# Patient Record
Sex: MALE | Race: WHITE | NOT HISPANIC OR LATINO
[De-identification: names, ages, dates, MRNs, and addresses within clinical notes are randomized per-mention and may not be internally consistent; named-entity substitution may affect disease eponyms.]

---

## 2018-01-22 ENCOUNTER — NON-APPOINTMENT (OUTPATIENT)
Age: 58
End: 2018-01-22

## 2018-01-22 ENCOUNTER — APPOINTMENT (OUTPATIENT)
Dept: CARDIOLOGY | Facility: CLINIC | Age: 58
End: 2018-01-22
Payer: COMMERCIAL

## 2018-01-22 VITALS
SYSTOLIC BLOOD PRESSURE: 144 MMHG | HEART RATE: 87 BPM | OXYGEN SATURATION: 97 % | BODY MASS INDEX: 43.63 KG/M2 | WEIGHT: 278 LBS | HEIGHT: 67 IN | DIASTOLIC BLOOD PRESSURE: 78 MMHG

## 2018-01-22 VITALS — SYSTOLIC BLOOD PRESSURE: 130 MMHG | HEART RATE: 80 BPM | DIASTOLIC BLOOD PRESSURE: 76 MMHG

## 2018-01-22 PROCEDURE — 93000 ELECTROCARDIOGRAM COMPLETE: CPT

## 2018-01-22 PROCEDURE — 99244 OFF/OP CNSLTJ NEW/EST MOD 40: CPT

## 2018-05-07 ENCOUNTER — APPOINTMENT (OUTPATIENT)
Dept: CARDIOLOGY | Facility: CLINIC | Age: 58
End: 2018-05-07
Payer: COMMERCIAL

## 2018-05-07 ENCOUNTER — NON-APPOINTMENT (OUTPATIENT)
Age: 58
End: 2018-05-07

## 2018-05-07 VITALS
WEIGHT: 260 LBS | OXYGEN SATURATION: 98 % | DIASTOLIC BLOOD PRESSURE: 69 MMHG | HEART RATE: 77 BPM | SYSTOLIC BLOOD PRESSURE: 115 MMHG | BODY MASS INDEX: 40.81 KG/M2 | HEIGHT: 67 IN | TEMPERATURE: 98.3 F

## 2018-05-07 DIAGNOSIS — K64.9 UNSPECIFIED HEMORRHOIDS: ICD-10-CM

## 2018-05-07 PROCEDURE — 99214 OFFICE O/P EST MOD 30 MIN: CPT

## 2018-05-07 PROCEDURE — 36415 COLL VENOUS BLD VENIPUNCTURE: CPT

## 2018-05-07 PROCEDURE — 93000 ELECTROCARDIOGRAM COMPLETE: CPT

## 2018-05-08 LAB
ALBUMIN SERPL ELPH-MCNC: 4.2 G/DL
ALP BLD-CCNC: 113 U/L
ALT SERPL-CCNC: 15 U/L
ANION GAP SERPL CALC-SCNC: 15 MMOL/L
AST SERPL-CCNC: 16 U/L
BASOPHILS # BLD AUTO: 0.02 K/UL
BASOPHILS NFR BLD AUTO: 0.2 %
BILIRUB SERPL-MCNC: 0.5 MG/DL
BUN SERPL-MCNC: 13 MG/DL
CALCIUM SERPL-MCNC: 9.4 MG/DL
CHLORIDE SERPL-SCNC: 98 MMOL/L
CHOLEST SERPL-MCNC: 142 MG/DL
CHOLEST/HDLC SERPL: 4.1 RATIO
CO2 SERPL-SCNC: 28 MMOL/L
CREAT SERPL-MCNC: 0.82 MG/DL
EOSINOPHIL # BLD AUTO: 0.22 K/UL
EOSINOPHIL NFR BLD AUTO: 2 %
GLUCOSE SERPL-MCNC: 84 MG/DL
HBA1C MFR BLD HPLC: 7.3 %
HCT VFR BLD CALC: 40.2 %
HDLC SERPL-MCNC: 35 MG/DL
HGB BLD-MCNC: 12.8 G/DL
IMM GRANULOCYTES NFR BLD AUTO: 0.5 %
LDLC SERPL CALC-MCNC: 79 MG/DL
LYMPHOCYTES # BLD AUTO: 2.39 K/UL
LYMPHOCYTES NFR BLD AUTO: 21.7 %
MAN DIFF?: NORMAL
MCHC RBC-ENTMCNC: 29.3 PG
MCHC RBC-ENTMCNC: 31.8 GM/DL
MCV RBC AUTO: 92 FL
MONOCYTES # BLD AUTO: 0.53 K/UL
MONOCYTES NFR BLD AUTO: 4.8 %
NEUTROPHILS # BLD AUTO: 7.82 K/UL
NEUTROPHILS NFR BLD AUTO: 70.8 %
PLATELET # BLD AUTO: 265 K/UL
POTASSIUM SERPL-SCNC: 4.5 MMOL/L
PROT SERPL-MCNC: 7.1 G/DL
RBC # BLD: 4.37 M/UL
RBC # FLD: 14.6 %
SODIUM SERPL-SCNC: 141 MMOL/L
TRIGL SERPL-MCNC: 138 MG/DL
TSH SERPL-ACNC: 2.09 UIU/ML
WBC # FLD AUTO: 11.03 K/UL

## 2018-05-18 ENCOUNTER — RX RENEWAL (OUTPATIENT)
Age: 58
End: 2018-05-18

## 2018-09-28 ENCOUNTER — RX RENEWAL (OUTPATIENT)
Age: 58
End: 2018-09-28

## 2018-10-03 ENCOUNTER — MEDICATION RENEWAL (OUTPATIENT)
Age: 58
End: 2018-10-03

## 2019-01-22 ENCOUNTER — RX RENEWAL (OUTPATIENT)
Age: 59
End: 2019-01-22

## 2019-04-11 ENCOUNTER — APPOINTMENT (OUTPATIENT)
Dept: OTOLARYNGOLOGY | Facility: CLINIC | Age: 59
End: 2019-04-11
Payer: COMMERCIAL

## 2019-04-11 VITALS
HEART RATE: 66 BPM | DIASTOLIC BLOOD PRESSURE: 64 MMHG | WEIGHT: 269 LBS | SYSTOLIC BLOOD PRESSURE: 106 MMHG | BODY MASS INDEX: 42.22 KG/M2 | HEIGHT: 67 IN

## 2019-04-11 DIAGNOSIS — H92.02 OTALGIA, LEFT EAR: ICD-10-CM

## 2019-04-11 PROCEDURE — 99203 OFFICE O/P NEW LOW 30 MIN: CPT | Mod: 25

## 2019-04-11 PROCEDURE — 69210 REMOVE IMPACTED EAR WAX UNI: CPT

## 2019-04-11 RX ORDER — BLOOD SUGAR DIAGNOSTIC
STRIP MISCELLANEOUS
Qty: 500 | Refills: 0 | Status: ACTIVE | COMMUNITY
Start: 2018-02-05

## 2019-04-11 RX ORDER — INSULIN DEGLUDEC INJECTION 200 U/ML
200 INJECTION, SOLUTION SUBCUTANEOUS
Qty: 45 | Refills: 0 | Status: ACTIVE | COMMUNITY
Start: 2019-01-24

## 2019-04-11 RX ORDER — LANCETS 30 GAUGE
EACH MISCELLANEOUS
Qty: 400 | Refills: 0 | Status: ACTIVE | COMMUNITY
Start: 2018-01-17

## 2019-04-11 RX ORDER — INSULIN ASPART 100 [IU]/ML
100 INJECTION, SOLUTION INTRAVENOUS; SUBCUTANEOUS
Qty: 90 | Refills: 0 | Status: ACTIVE | COMMUNITY
Start: 2018-02-05

## 2019-04-11 NOTE — PHYSICAL EXAM
[Midline] : trachea located in midline position [de-identified] : b/l migueln [Normal] : salivary glands are normal

## 2019-04-11 NOTE — HISTORY OF PRESENT ILLNESS
[No] : patient does not have a  history of radiation therapy [None] : No risk factors have been identified. [de-identified] : 57 y/o male who came in complaining of b/l ear pressure and itchiness that happened 2-3 months ago. He states the pressure and the itchiness lasted for about 1-2 days. He states he will have intermittent episodes of b/l pressure and itchiness in his ears but has not had an episode since 3 months ago. He states he has no ear pain, discharge, fullness, pressure, tinnitus or vertigo. His wife thinks he has hearing loss but he doesn’t feel the same. Pt has no  nasal congestion, nasal discharge, epistaxis, sinus infections, facial pain, facial pressure, throat pain, dysphagia or fevers\par  [Tinnitus] : no tinnitus [Anxiety] : no anxiety [Dizziness] : no dizziness [Headache] : no headache [Hearing Loss] : no hearing loss [Otalgia] : no otalgia [Otorrhea] : no otorrhea [Vertigo] : no vertigo [Recurrent Otitis Media] : no recurrent otitis media [Otitis Media with Effusion] : no otitis media with effusion [Presbycusis] : no presbycusis [Congenital Ear Malformation] : no congenital ear malformation [Meniere Disease] : no Meniere disease [Otosclerosis] : no otosclerosis [Perilymphatic Fistula] : no perilymphatic fistula [Hypertension] : no hypertension [Loud Noise Exposure] : no history of loud noise exposure [Smoking] : no smoking [Early Onset Hearing Loss] : no early onset hearing loss [Stroke] : no stroke [Facial Pain] : no facial pain [Facial Pressure] : no facial pressure [Nasal Congestion] : no nasal congestion [Ear Pain] : no ear pain [Ear Pressure] : no ear pressure [Diplopia] : no diplopia [Ear Fullness] : no ear fullness [Allergic Rhinitis] : no allergic rhinitis [Environmental Allergies] : no environmental allergies [Seasonal Allergies] : no seasonal allergies [Environmental Allergens] : no environmental allergens [Adenoidectomy] : no adenoidectomy [Allergies] : no allergies No [Asthma] : no asthma [Neck Mass] : no neck mass [Neck Pain] : no neck pain [Chills] : no chills [Cold Intolerance] : no cold intolerance [Cough] : no cough [Fatigue] : no fatigue [Heat Intolerance] : no heat intolerance [Hyperthyroidism] : no hyperthyroidism [Sialadenitis] : no sialadenitis [Hodgkin Disease] : no hodgkin disease [Non-Hodgkin Lymphoma] : no non-hodgkin lymphoma [Tobacco Use] : no tobacco use [Alcohol Use] : no alcohol use [Graves Disease] : no graves disease [Thyroid Cancer] : no thyroid cancer

## 2019-04-11 NOTE — ASSESSMENT
[FreeTextEntry1] : 57 y/o male w/ b/l cerumen impaction \par \par After informed verbal consent is obtained, cerumen is removed from the right and left  ear canal with a curette and suction.\par Rx: \par Debrox was prescribed and  is to be placed in both ears on a routine basis to keep them free of wax.\par Routine debridement suggested to keep the ears free of wax.\par \par Pt refused audio for today\par

## 2019-04-16 ENCOUNTER — RX RENEWAL (OUTPATIENT)
Age: 59
End: 2019-04-16

## 2019-09-02 ENCOUNTER — RX RENEWAL (OUTPATIENT)
Age: 59
End: 2019-09-02

## 2019-11-06 ENCOUNTER — APPOINTMENT (OUTPATIENT)
Dept: ORTHOPEDIC SURGERY | Facility: CLINIC | Age: 59
End: 2019-11-06
Payer: COMMERCIAL

## 2019-11-06 VITALS
DIASTOLIC BLOOD PRESSURE: 74 MMHG | SYSTOLIC BLOOD PRESSURE: 145 MMHG | WEIGHT: 267 LBS | HEART RATE: 77 BPM | BODY MASS INDEX: 41.91 KG/M2 | HEIGHT: 67 IN

## 2019-11-06 DIAGNOSIS — M25.561 PAIN IN RIGHT KNEE: ICD-10-CM

## 2019-11-06 DIAGNOSIS — G89.29 PAIN IN RIGHT KNEE: ICD-10-CM

## 2019-11-06 PROCEDURE — 99203 OFFICE O/P NEW LOW 30 MIN: CPT

## 2019-11-06 PROCEDURE — 73562 X-RAY EXAM OF KNEE 3: CPT

## 2019-12-17 ENCOUNTER — RX RENEWAL (OUTPATIENT)
Age: 59
End: 2019-12-17

## 2020-01-20 ENCOUNTER — RX RENEWAL (OUTPATIENT)
Age: 60
End: 2020-01-20

## 2020-03-02 ENCOUNTER — RX RENEWAL (OUTPATIENT)
Age: 60
End: 2020-03-02

## 2020-03-09 ENCOUNTER — APPOINTMENT (OUTPATIENT)
Dept: CARDIOLOGY | Facility: CLINIC | Age: 60
End: 2020-03-09
Payer: COMMERCIAL

## 2020-03-09 ENCOUNTER — NON-APPOINTMENT (OUTPATIENT)
Age: 60
End: 2020-03-09

## 2020-03-09 VITALS
WEIGHT: 270 LBS | SYSTOLIC BLOOD PRESSURE: 130 MMHG | BODY MASS INDEX: 42.38 KG/M2 | DIASTOLIC BLOOD PRESSURE: 76 MMHG | HEART RATE: 77 BPM | HEIGHT: 67 IN | OXYGEN SATURATION: 98 % | TEMPERATURE: 97.6 F

## 2020-03-09 DIAGNOSIS — R07.9 CHEST PAIN, UNSPECIFIED: ICD-10-CM

## 2020-03-09 DIAGNOSIS — N52.9 MALE ERECTILE DYSFUNCTION, UNSPECIFIED: ICD-10-CM

## 2020-03-09 PROCEDURE — 36415 COLL VENOUS BLD VENIPUNCTURE: CPT

## 2020-03-09 PROCEDURE — 99214 OFFICE O/P EST MOD 30 MIN: CPT

## 2020-03-09 PROCEDURE — 93000 ELECTROCARDIOGRAM COMPLETE: CPT

## 2020-03-09 RX ORDER — ROSUVASTATIN CALCIUM 40 MG/1
40 TABLET, FILM COATED ORAL
Qty: 90 | Refills: 0 | Status: DISCONTINUED | COMMUNITY
Start: 2018-01-22 | End: 2020-03-09

## 2020-03-09 RX ORDER — METFORMIN ER 500 MG 500 MG/1
500 TABLET ORAL
Qty: 360 | Refills: 0 | Status: DISCONTINUED | COMMUNITY
Start: 2018-02-05 | End: 2020-03-09

## 2020-03-09 NOTE — REVIEW OF SYSTEMS
[see HPI] : see HPI [Negative] : Heme/Lymph [Recent Weight Gain (___ Lbs)] : recent [unfilled] ~Ulb weight gain [Feeling Fatigued] : not feeling fatigued [Recent Weight Loss (___ Lbs)] : no recent weight loss [Shortness Of Breath] : no shortness of breath [Dyspnea on exertion] : not dyspnea during exertion [Chest  Pressure] : chest pressure [Chest Pain] : no chest pain [Lower Ext Edema] : no extremity edema [Leg Claudication] : no intermittent leg claudication [Palpitations] : no palpitations [Heartburn] : heartburn [Change in Appetite] : no change in appetite [Urinary Frequency] : no change in urinary frequency [Skin: A Rash] : no rash: [Dizziness] : no dizziness [Depression] : no depression [Under Stress] : not under stress

## 2020-03-09 NOTE — PHYSICAL EXAM
[Normal Appearance] : normal appearance [No Deformities] : no deformities [General Appearance - In No Acute Distress] : no acute distress [Normal Conjunctiva] : the conjunctiva exhibited no abnormalities [Eyelids - No Xanthelasma] : the eyelids demonstrated no xanthelasmas [Normal Oral Mucosa] : normal oral mucosa [No Oral Pallor] : no oral pallor [No Oral Cyanosis] : no oral cyanosis [Normal Jugular Venous A Waves Present] : normal jugular venous A waves present [Normal Jugular Venous V Waves Present] : normal jugular venous V waves present [No Jugular Venous Gordon A Waves] : no jugular venous gordon A waves [Respiration, Rhythm And Depth] : normal respiratory rhythm and effort [Exaggerated Use Of Accessory Muscles For Inspiration] : no accessory muscle use [Auscultation Breath Sounds / Voice Sounds] : lungs were clear to auscultation bilaterally [Heart Rate And Rhythm] : heart rate and rhythm were normal [Heart Sounds] : normal S1 and S2 [Murmurs] : no murmurs present [Abdomen Soft] : soft [Abdomen Tenderness] : non-tender [Abdomen Mass (___ Cm)] : no abdominal mass palpated [Abnormal Walk] : normal gait [Gait - Sufficient For Exercise Testing] : the gait was sufficient for exercise testing [Nail Clubbing] : no clubbing of the fingernails [Cyanosis, Localized] : no localized cyanosis [Petechial Hemorrhages (___cm)] : no petechial hemorrhages [Skin Color & Pigmentation] : normal skin color and pigmentation [] : no rash [No Venous Stasis] : no venous stasis [Skin Lesions] : no skin lesions [No Skin Ulcers] : no skin ulcer [No Xanthoma] : no  xanthoma was observed [Oriented To Time, Place, And Person] : oriented to person, place, and time [Affect] : the affect was normal [Mood] : the mood was normal [No Anxiety] : not feeling anxious [FreeTextEntry1] : No edema bilaterally. Pulses 2+ bilaterally including pedal

## 2020-03-09 NOTE — REASON FOR VISIT
[FreeTextEntry1] : Follow up visit after "normal" cardiac cath in [facility] for exertional chest pain.

## 2020-03-09 NOTE — HISTORY OF PRESENT ILLNESS
[FreeTextEntry1] : June 9, 2014. I first saw the patient in 2004 when he was 43 years old. At that point he was overweight with diabetes and hyperlipidemia and had stopped smoking 2 years prior he came because of an episode of passing out while on the plane after he got up to go to the bathroom. His past history had been unremarkable other than some minor surgery. He had a story compatible with acid reflux. The assumption was vasopressor syncope although he did have a sestamibi stress test on March 15 of 2004. That showed no ischemia to a heart rate of 169 with a left ventricular ejection fraction of 58%. The patient had another episode after stomach upset in July while on the airplane. He was recommended to have a tilt table test and consider beta blocker. I did not see him again until 2008. At that point he was having chest pain although he was under a lot of stress. He is being followed by Dr. Reshma Nichols for his diabetes. August of 2090 seemed to have some exertional chest pain although part of it was just getting up from a chair. This progressed until October after doing some minor building around the house he had chest tightness diffusely. EKG was unremarkable. He has next rest test was not until March of 2010 and again sestamibi scan was negative with a left ventricular ejection fraction of 63% the patient reached a heart rate of 167 achieving 14 METs. In February of 2011 he noticed chest tightness walking in the hills in [place]. Again exam and EKG were unremarkable. He did not return for a stress echo until a year later in February 2012 and again this seemed to be negative although the echo was suboptimal due to body habitus. He did reach a heart rate of 160 without EKG changes or symptoms. In August 2013 he had chest pain one night prior to leaving on a cruise. He came in for a stress echocardiogram which again showed no ST changes to a heart rate of 132 and 10 METs. Echocardiogram was only slightly suboptimal and showed no wall motion abnormalities.\par June 9, 2014. About one week ago walking to [employer] he noticed exertional chest tightness relieved with rest. However this has not recurred. 2 days ago he noticed significant swelling of his ankles. This happened to him once when he was in [place] and he was taken to a physician there who gave him hydrochlorothiazide and it resolved. The patient took his own hydrochlorothiazide and this seemed to improve somewhat but he is here now because he is anxious about these findings. He has been walking normally and even exercising a little bit since the chest pain episode and it has not recurred. In the office he is asymptomatic though slightly anxious. His EKG is totally within normal limits.\par January 22, 2018. In November the patient was in [place] and had classic exertional chest pressure walking uphill. Went to the hospital and was admitted, ruled out, coronary angiography totally normal and he was sent home. He is here now for followup. I reviewed the angiogram, and there is probably plaquing in the LAD, but no obstructive disease. He is currently seeing the endocrinology group of Dr. Ellsworth, etc. He is on Crestor, aspirin, and metformin, along with insulin. He was given a prescription in [place] for ramipril, but they did not fill it. He had been on Invokana in the past, but it made him run to the bathroom, way too much. Since being back he has had  rare exertional chest pain, usually after smoking a cigar. Otherwise, he is active and asymptomatic. He claims his labs recently, and his blood pressure at endocrine or excellent. (Normal TSH, and vitamin D 16, BUN 22, creatinine 0.5, 5, and potassium 4.7, normal LFTs, cholesterol 142, HDL 37, LDL 88, triglycerides 131. Hemoglobin A1c 7.3.)\par May 7, 2018. Patient is here in followup. Was in [place] in the interim. Has lost 18 pounds, but says that he had gained in between and lost even more since. No chest pain or shortness of breath. EKG within the normal limits. No change in his cardiac medicines with just a little change in his long-acting insulin. Patient's issue now seems to be hemorrhoids, and he will be seeing Dr. Olivarez and then Dr. Mcgee.\par March 9, 2020.  Almost 2 years since he was here.  Is now on atorvastatin 80, Ramipril 5, and aspirin 81 mg.  Otherwise is on 3 different types of insulin.  Weight has gone back up from 260-270.  Only gets exertional chest pain if he takes a little too much scotch usually on Friday night and then goes walking uphill.  This would suggest that his classic exertional chest pain with normal coronary arteries was probably exercise-induced esophageal reflux.  In any case with his risk factors he still needs close monitoring of his cardiovascular system.  Most of the time he is asymptomatic and his EKG has sinus rhythm and is within normal limits.  His blood pressure is under good control.  There may have been a mild LAD plaque at the time of his angiogram.

## 2020-03-11 LAB
ALBUMIN SERPL ELPH-MCNC: 4.1 G/DL
ALP BLD-CCNC: 132 U/L
ALT SERPL-CCNC: 15 U/L
ANION GAP SERPL CALC-SCNC: 11 MMOL/L
AST SERPL-CCNC: 11 U/L
BASOPHILS # BLD AUTO: 0.05 K/UL
BASOPHILS NFR BLD AUTO: 0.5 %
BILIRUB SERPL-MCNC: 0.5 MG/DL
BUN SERPL-MCNC: 14 MG/DL
CALCIUM SERPL-MCNC: 9.7 MG/DL
CHLORIDE SERPL-SCNC: 97 MMOL/L
CHOLEST SERPL-MCNC: 151 MG/DL
CHOLEST/HDLC SERPL: 4.5 RATIO
CO2 SERPL-SCNC: 30 MMOL/L
CREAT SERPL-MCNC: 0.75 MG/DL
EOSINOPHIL # BLD AUTO: 0.21 K/UL
EOSINOPHIL NFR BLD AUTO: 2.3 %
ESTIMATED AVERAGE GLUCOSE: 174 MG/DL
GLUCOSE SERPL-MCNC: 139 MG/DL
HBA1C MFR BLD HPLC: 7.7 %
HCT VFR BLD CALC: 40.5 %
HDLC SERPL-MCNC: 34 MG/DL
HGB BLD-MCNC: 12.9 G/DL
IMM GRANULOCYTES NFR BLD AUTO: 0.8 %
LDLC SERPL CALC-MCNC: 96 MG/DL
LYMPHOCYTES # BLD AUTO: 2.1 K/UL
LYMPHOCYTES NFR BLD AUTO: 22.7 %
MAN DIFF?: NORMAL
MCHC RBC-ENTMCNC: 29.2 PG
MCHC RBC-ENTMCNC: 31.9 GM/DL
MCV RBC AUTO: 91.6 FL
MONOCYTES # BLD AUTO: 0.55 K/UL
MONOCYTES NFR BLD AUTO: 5.9 %
NEUTROPHILS # BLD AUTO: 6.27 K/UL
NEUTROPHILS NFR BLD AUTO: 67.8 %
PLATELET # BLD AUTO: 247 K/UL
POTASSIUM SERPL-SCNC: 4.6 MMOL/L
PROT SERPL-MCNC: 7 G/DL
RBC # BLD: 4.42 M/UL
RBC # FLD: 14.5 %
SODIUM SERPL-SCNC: 138 MMOL/L
TRIGL SERPL-MCNC: 106 MG/DL
TSH SERPL-ACNC: 2.85 UIU/ML
WBC # FLD AUTO: 9.25 K/UL

## 2020-05-12 ENCOUNTER — APPOINTMENT (OUTPATIENT)
Dept: INTERNAL MEDICINE | Facility: CLINIC | Age: 60
End: 2020-05-12
Payer: COMMERCIAL

## 2020-05-12 VITALS
WEIGHT: 268 LBS | SYSTOLIC BLOOD PRESSURE: 126 MMHG | BODY MASS INDEX: 41.57 KG/M2 | OXYGEN SATURATION: 97 % | HEART RATE: 80 BPM | TEMPERATURE: 98.4 F | DIASTOLIC BLOOD PRESSURE: 74 MMHG | HEIGHT: 67.5 IN

## 2020-05-12 DIAGNOSIS — J30.2 OTHER SEASONAL ALLERGIC RHINITIS: ICD-10-CM

## 2020-05-12 DIAGNOSIS — Z80.0 FAMILY HISTORY OF MALIGNANT NEOPLASM OF DIGESTIVE ORGANS: ICD-10-CM

## 2020-05-12 PROCEDURE — 93000 ELECTROCARDIOGRAM COMPLETE: CPT | Mod: 59

## 2020-05-12 PROCEDURE — G0444 DEPRESSION SCREEN ANNUAL: CPT

## 2020-05-12 PROCEDURE — 99396 PREV VISIT EST AGE 40-64: CPT | Mod: 25

## 2020-05-12 PROCEDURE — 36415 COLL VENOUS BLD VENIPUNCTURE: CPT

## 2020-05-12 RX ORDER — DULAGLUTIDE 1.5 MG/.5ML
1.5 INJECTION, SOLUTION SUBCUTANEOUS
Qty: 6 | Refills: 0 | Status: DISCONTINUED | COMMUNITY
Start: 2019-04-08 | End: 2020-05-12

## 2020-05-12 RX ORDER — ERGOCALCIFEROL 1.25 MG/1
1.25 MG CAPSULE, LIQUID FILLED ORAL
Qty: 13 | Refills: 0 | Status: ACTIVE | COMMUNITY
Start: 2019-08-16

## 2020-05-12 RX ORDER — MONTELUKAST 10 MG/1
10 TABLET, FILM COATED ORAL
Qty: 30 | Refills: 0 | Status: DISCONTINUED | COMMUNITY
Start: 2020-05-04

## 2020-05-12 RX ORDER — FLASH GLUCOSE SENSOR
KIT MISCELLANEOUS
Qty: 4 | Refills: 0 | Status: ACTIVE | COMMUNITY
Start: 2019-12-19

## 2020-05-13 ENCOUNTER — APPOINTMENT (OUTPATIENT)
Dept: ORTHOPEDIC SURGERY | Facility: CLINIC | Age: 60
End: 2020-05-13
Payer: COMMERCIAL

## 2020-05-13 VITALS
HEART RATE: 82 BPM | SYSTOLIC BLOOD PRESSURE: 128 MMHG | WEIGHT: 268 LBS | TEMPERATURE: 98.9 F | DIASTOLIC BLOOD PRESSURE: 76 MMHG | BODY MASS INDEX: 41.57 KG/M2 | HEIGHT: 67.5 IN

## 2020-05-13 DIAGNOSIS — G56.03 CARPAL TUNNEL SYNDROM,BILATERAL UPPER LIMBS: ICD-10-CM

## 2020-05-13 PROCEDURE — 99214 OFFICE O/P EST MOD 30 MIN: CPT | Mod: 25

## 2020-05-13 PROCEDURE — 20526 THER INJECTION CARP TUNNEL: CPT | Mod: 50

## 2020-05-18 RX ORDER — SILDENAFIL 50 MG/1
50 TABLET ORAL
Qty: 6 | Refills: 5 | Status: ACTIVE | COMMUNITY
Start: 2020-03-09

## 2020-06-04 ENCOUNTER — RX CHANGE (OUTPATIENT)
Age: 60
End: 2020-06-04

## 2020-06-07 PROBLEM — Z80.0 FAMILY HISTORY OF PANCREATIC CANCER: Status: ACTIVE | Noted: 2020-06-07

## 2020-06-07 NOTE — HEALTH RISK ASSESSMENT
[No] : In the past 12 months have you used drugs other than those required for medical reasons? No [No falls in past year] : Patient reported no falls in the past year [Patient reported colonoscopy was normal] : Patient reported colonoscopy was normal [None] : None [Employed] : employed [] :  [] : No [ColonoscopyDate] : 12/18

## 2020-06-07 NOTE — PHYSICAL EXAM
[No Acute Distress] : no acute distress [Well Nourished] : well nourished [Well Developed] : well developed [Well-Appearing] : well-appearing [Normal Voice/Communication] : normal voice/communication [Normal Sclera/Conjunctiva] : normal sclera/conjunctiva [PERRL] : pupils equal round and reactive to light [Normal Outer Ear/Nose] : the outer ears and nose were normal in appearance [Normal Oropharynx] : the oropharynx was normal [Normal TMs] : both tympanic membranes were normal [No JVD] : no jugular venous distention [Supple] : supple [No Lymphadenopathy] : no lymphadenopathy [No Respiratory Distress] : no respiratory distress  [Thyroid Normal, No Nodules] : the thyroid was normal and there were no nodules present [No Accessory Muscle Use] : no accessory muscle use [Clear to Auscultation] : lungs were clear to auscultation bilaterally [Normal Rate] : normal rate  [Regular Rhythm] : with a regular rhythm [Normal S1, S2] : normal S1 and S2 [No Murmur] : no murmur heard [No Abdominal Bruit] : a ~M bruit was not heard ~T in the abdomen [No Carotid Bruits] : no carotid bruits [No Varicosities] : no varicosities [Pedal Pulses Present] : the pedal pulses are present [No Edema] : there was no peripheral edema [Soft] : abdomen soft [No Extremity Clubbing/Cyanosis] : no extremity clubbing/cyanosis [Non Tender] : non-tender [Non-distended] : non-distended [Normal Bowel Sounds] : normal bowel sounds [No HSM] : no HSM [Normal Supraclavicular Nodes] : no supraclavicular lymphadenopathy [Normal Posterior Cervical Nodes] : no posterior cervical lymphadenopathy [Normal Anterior Cervical Nodes] : no anterior cervical lymphadenopathy [No CVA Tenderness] : no CVA  tenderness [No Spinal Tenderness] : no spinal tenderness [No Joint Swelling] : no joint swelling [Grossly Normal Strength/Tone] : grossly normal strength/tone [No Rash] : no rash [Coordination Grossly Intact] : coordination grossly intact [Normal Gait] : normal gait [No Focal Deficits] : no focal deficits [Normal Affect] : the affect was normal [Speech Grossly Normal] : speech grossly normal [Alert and Oriented x3] : oriented to person, place, and time [Normal Mood] : the mood was normal [Normal Insight/Judgement] : insight and judgment were intact [de-identified] : obese

## 2020-06-07 NOTE — PLAN
[FreeTextEntry1] : discussed w pt \par \par check routine labs as below \par monitor Hgba1c , pt reports in 7-8 % range recently\par following w endocrine for chronic DM and obesity management \par \par cont current rx\par diet, exercise, weight loss advised \par low carb intake \par \par ophtho f/u yearly \par \par cont PPI for GERD control \par \par check additional routine labs \par \par vaccines UTD as per pt\par colonoscopy screening UTD \par \par RTO 6 months for f/u or earlier prn if any new concerns

## 2020-06-07 NOTE — HISTORY OF PRESENT ILLNESS
[de-identified] : 58 y/o man presents for initial visit to establish primary care w new internist. following previously w Dr Rand as PMD. he feels well overall currently. \par \par chronic conditions significant for type II DM on rx, following w Dr Hurley ,endocrinologist regularly. on Tresiba, Ozempic inj, Novalog prn, ACE-I\par hyperlipidemia on statin \par obesity chronic \par chronic GERD on PPI \par \par seasonal allergies on antihistamine regularly during allergy season \par \par last colonoscopy screening in 2018 w Dr Olivarez GI, pt recalls normal results

## 2020-06-15 LAB
ALBUMIN SERPL ELPH-MCNC: 4.3 G/DL
ALP BLD-CCNC: 134 U/L
ALT SERPL-CCNC: 11 U/L
ANION GAP SERPL CALC-SCNC: 14 MMOL/L
APPEARANCE: CLEAR
AST SERPL-CCNC: 12 U/L
BILIRUB SERPL-MCNC: 0.5 MG/DL
BILIRUBIN URINE: NEGATIVE
BLOOD URINE: NEGATIVE
BUN SERPL-MCNC: 17 MG/DL
CALCIUM SERPL-MCNC: 9.3 MG/DL
CHLORIDE SERPL-SCNC: 100 MMOL/L
CHOLEST SERPL-MCNC: 124 MG/DL
CHOLEST/HDLC SERPL: 3.6 RATIO
CO2 SERPL-SCNC: 24 MMOL/L
COLOR: YELLOW
CREAT SERPL-MCNC: 0.82 MG/DL
CREAT SPEC-SCNC: 85 MG/DL
ESTIMATED AVERAGE GLUCOSE: 154 MG/DL
GLUCOSE QUALITATIVE U: NEGATIVE
GLUCOSE SERPL-MCNC: 116 MG/DL
HBA1C MFR BLD HPLC: 7 %
HDLC SERPL-MCNC: 34 MG/DL
KETONES URINE: NEGATIVE
LDLC SERPL CALC-MCNC: 76 MG/DL
LEUKOCYTE ESTERASE URINE: NEGATIVE
MICROALBUMIN 24H UR DL<=1MG/L-MCNC: <1.2 MG/DL
MICROALBUMIN/CREAT 24H UR-RTO: NORMAL MG/G
NITRITE URINE: NEGATIVE
PH URINE: 7
POTASSIUM SERPL-SCNC: 4.6 MMOL/L
PROT SERPL-MCNC: 7.2 G/DL
PROTEIN URINE: NEGATIVE
PSA SERPL-MCNC: 0.61 NG/ML
SODIUM SERPL-SCNC: 138 MMOL/L
SPECIFIC GRAVITY URINE: 1.02
T4 FREE SERPL-MCNC: 1.3 NG/DL
TRIGL SERPL-MCNC: 67 MG/DL
TSH SERPL-ACNC: 2.55 UIU/ML
UROBILINOGEN URINE: NORMAL

## 2020-06-16 ENCOUNTER — RX RENEWAL (OUTPATIENT)
Age: 60
End: 2020-06-16

## 2020-07-27 ENCOUNTER — OUTPATIENT (OUTPATIENT)
Dept: OUTPATIENT SERVICES | Facility: HOSPITAL | Age: 60
LOS: 1 days | End: 2020-07-27
Payer: COMMERCIAL

## 2020-07-27 ENCOUNTER — APPOINTMENT (OUTPATIENT)
Dept: RADIOLOGY | Facility: CLINIC | Age: 60
End: 2020-07-27
Payer: COMMERCIAL

## 2020-07-27 DIAGNOSIS — Z00.8 ENCOUNTER FOR OTHER GENERAL EXAMINATION: ICD-10-CM

## 2020-07-27 PROCEDURE — 71100 X-RAY EXAM RIBS UNI 2 VIEWS: CPT | Mod: 26,RT

## 2020-07-27 PROCEDURE — 71100 X-RAY EXAM RIBS UNI 2 VIEWS: CPT

## 2020-08-06 ENCOUNTER — APPOINTMENT (OUTPATIENT)
Dept: INTERNAL MEDICINE | Facility: CLINIC | Age: 60
End: 2020-08-06
Payer: COMMERCIAL

## 2020-08-06 VITALS
HEIGHT: 67.5 IN | BODY MASS INDEX: 41.42 KG/M2 | WEIGHT: 267 LBS | OXYGEN SATURATION: 98 % | SYSTOLIC BLOOD PRESSURE: 122 MMHG | HEART RATE: 79 BPM | TEMPERATURE: 98.6 F | DIASTOLIC BLOOD PRESSURE: 70 MMHG

## 2020-08-06 DIAGNOSIS — S29.012S: ICD-10-CM

## 2020-08-06 DIAGNOSIS — Z23 ENCOUNTER FOR IMMUNIZATION: ICD-10-CM

## 2020-08-06 PROCEDURE — 90715 TDAP VACCINE 7 YRS/> IM: CPT

## 2020-08-06 PROCEDURE — 90471 IMMUNIZATION ADMIN: CPT

## 2020-08-06 PROCEDURE — 99214 OFFICE O/P EST MOD 30 MIN: CPT | Mod: 25

## 2020-08-06 NOTE — HISTORY OF PRESENT ILLNESS
[de-identified] : presents for eval of R mid back discomfort intermittently for about 3 months. he fell in 4/20, onto his back. had significant pain then, slowly improved. now has pain only when bending in certain positions. taking aleve prn, w tylenol. last night was first time he had improvement in weeks. saw pain management Dr Barker recently. xrays R ribs ordered , no fractures .he has known C spine OA and degenerative changes, managed conservatively. no dyspnea or pleuritic pain. no bruising. feels much better currently \par \par obesity unchanged\par \par DM managed w his endocrinologist \par \par he would like to discuss Tdap vaccine as his daughter in law will be due to give birth soon

## 2020-08-06 NOTE — ASSESSMENT
[FreeTextEntry1] : discussed w pt \par \par reviewed recent xrays ribs, no fractures. DJD of spine noted. most likely residual muscular strain from his fall few months ago. he noted significant improvement today. advised cont to try Aleve prn cautiously. heat advised. offered PT if needed, he prefers to monitor for now. \par \par cont efforts at diet control, weight loss \par \par Tdap vaccine discussed and administered today \par \par RTO 3-4 months or earlier prn

## 2020-08-06 NOTE — PHYSICAL EXAM
[Normal Voice/Communication] : normal voice/communication [Well-Appearing] : well-appearing [No Acute Distress] : no acute distress [No Lymphadenopathy] : no lymphadenopathy [No Accessory Muscle Use] : no accessory muscle use [No Respiratory Distress] : no respiratory distress  [Regular Rhythm] : with a regular rhythm [Clear to Auscultation] : lungs were clear to auscultation bilaterally [Normal Rate] : normal rate  [Normal S1, S2] : normal S1 and S2 [No Murmur] : no murmur heard [No CVA Tenderness] : no CVA  tenderness [Normal Gait] : normal gait [Normal Mood] : the mood was normal [No Spinal Tenderness] : no spinal tenderness [Normal Affect] : the affect was normal [Normal Insight/Judgement] : insight and judgment were intact

## 2020-08-06 NOTE — REVIEW OF SYSTEMS
[Muscle Pain] : muscle pain [Joint Pain] : no joint pain [Muscle Weakness] : no muscle weakness [Back Pain] : back pain [Joint Swelling] : no joint swelling [Negative] : Heme/Lymph

## 2020-09-15 ENCOUNTER — APPOINTMENT (OUTPATIENT)
Dept: INTERNAL MEDICINE | Facility: CLINIC | Age: 60
End: 2020-09-15

## 2020-09-21 ENCOUNTER — APPOINTMENT (OUTPATIENT)
Dept: CARDIOLOGY | Facility: CLINIC | Age: 60
End: 2020-09-21
Payer: COMMERCIAL

## 2020-09-21 ENCOUNTER — NON-APPOINTMENT (OUTPATIENT)
Age: 60
End: 2020-09-21

## 2020-09-21 VITALS
HEART RATE: 61 BPM | OXYGEN SATURATION: 99 % | RESPIRATION RATE: 17 BRPM | HEIGHT: 67.5 IN | DIASTOLIC BLOOD PRESSURE: 60 MMHG | BODY MASS INDEX: 41.57 KG/M2 | SYSTOLIC BLOOD PRESSURE: 128 MMHG | WEIGHT: 268 LBS | TEMPERATURE: 97.5 F

## 2020-09-21 PROCEDURE — 99214 OFFICE O/P EST MOD 30 MIN: CPT

## 2020-09-21 PROCEDURE — 93000 ELECTROCARDIOGRAM COMPLETE: CPT

## 2020-09-21 PROCEDURE — 36415 COLL VENOUS BLD VENIPUNCTURE: CPT

## 2020-09-21 NOTE — HISTORY OF PRESENT ILLNESS
[FreeTextEntry1] : June 9, 2014. I first saw the patient in 2004 when he was 43 years old. At that point he was overweight with diabetes and hyperlipidemia and had stopped smoking 2 years prior he came because of an episode of passing out while on the plane after he got up to go to the bathroom. His past history had been unremarkable other than some minor surgery. He had a story compatible with acid reflux. The assumption was vasopressor syncope although he did have a sestamibi stress test on March 15 of 2004. That showed no ischemia to a heart rate of 169 with a left ventricular ejection fraction of 58%. The patient had another episode after stomach upset in July while on the airplane. He was recommended to have a tilt table test and consider beta blocker. I did not see him again until 2008. At that point he was having chest pain although he was under a lot of stress. He is being followed by Dr. Reshma Nichols for his diabetes. August of 2090 seemed to have some exertional chest pain although part of it was just getting up from a chair. This progressed until October after doing some minor building around the house he had chest tightness diffusely. EKG was unremarkable. He has next rest test was not until March of 2010 and again sestamibi scan was negative with a left ventricular ejection fraction of 63% the patient reached a heart rate of 167 achieving 14 METs. In February of 2011 he noticed chest tightness walking in the hills in [place]. Again exam and EKG were unremarkable. He did not return for a stress echo until a year later in February 2012 and again this seemed to be negative although the echo was suboptimal due to body habitus. He did reach a heart rate of 160 without EKG changes or symptoms. In August 2013 he had chest pain one night prior to leaving on a cruise. He came in for a stress echocardiogram which again showed no ST changes to a heart rate of 132 and 10 METs. Echocardiogram was only slightly suboptimal and showed no wall motion abnormalities.\par June 9, 2014. About one week ago walking to [employer] he noticed exertional chest tightness relieved with rest. However this has not recurred. 2 days ago he noticed significant swelling of his ankles. This happened to him once when he was in [place] and he was taken to a physician there who gave him hydrochlorothiazide and it resolved. The patient took his own hydrochlorothiazide and this seemed to improve somewhat but he is here now because he is anxious about these findings. He has been walking normally and even exercising a little bit since the chest pain episode and it has not recurred. In the office he is asymptomatic though slightly anxious. His EKG is totally within normal limits.\par January 22, 2018. In November the patient was in [place] and had classic exertional chest pressure walking uphill. Went to the hospital and was admitted, ruled out, coronary angiography totally normal and he was sent home. He is here now for followup. I reviewed the angiogram, and there is probably plaquing in the LAD, but no obstructive disease. He is currently seeing the endocrinology group of Dr. Ellsworth, etc. He is on Crestor, aspirin, and metformin, along with insulin. He was given a prescription in [place] for ramipril, but they did not fill it. He had been on Invokana in the past, but it made him run to the bathroom, way too much. Since being back he has had  rare exertional chest pain, usually after smoking a cigar. Otherwise, he is active and asymptomatic. He claims his labs recently, and his blood pressure at endocrine or excellent. (Normal TSH, and vitamin D 16, BUN 22, creatinine 0.5, 5, and potassium 4.7, normal LFTs, cholesterol 142, HDL 37, LDL 88, triglycerides 131. Hemoglobin A1c 7.3.)\par May 7, 2018. Patient is here in followup. Was in [place] in the interim. Has lost 18 pounds, but says that he had gained in between and lost even more since. No chest pain or shortness of breath. EKG within the normal limits. No change in his cardiac medicines with just a little change in his long-acting insulin. Patient's issue now seems to be hemorrhoids, and he will be seeing Dr. Olivarez and then Dr. Mcgee.\par March 9, 2020.  Almost 2 years since he was here.  Is now on atorvastatin 80, Ramipril 5, and aspirin 81 mg.  Otherwise is on 3 different types of insulin.  Weight has gone back up from 260-270.  Only gets exertional chest pain if he takes a little too much scotch usually on Friday night and then goes walking uphill.  This would suggest that his classic exertional chest pain with normal coronary arteries was probably exercise-induced esophageal reflux.  In any case with his risk factors he still needs close monitoring of his cardiovascular system.  Most of the time he is asymptomatic and his EKG has sinus rhythm and is within normal limits.  His blood pressure is under good control.  There may have been a mild LAD plaque at the time of his angiogram..\par September 21, 2020.  Patient here in follow-up.  Denies chest pain or shortness of breath.  Blood pressure well controlled.  EKG is sinus bradycardia at 58 and within normal limits.  Patient is overweight but unchanged.  Had labs with Dr. Christianson with hemoglobin A1c 7.0 back in May, normal renal function, cholesterol 124, HDL 34, LDL 76.

## 2020-09-21 NOTE — REASON FOR VISIT
[FreeTextEntry1] : Follow up visit after "normal" cardiac cath in [facility] for exertional chest pain.

## 2020-09-21 NOTE — REVIEW OF SYSTEMS
[see HPI] : see HPI [Negative] : Heme/Lymph [Recent Weight Gain (___ Lbs)] : no recent weight gain [Feeling Fatigued] : not feeling fatigued [Recent Weight Loss (___ Lbs)] : no recent weight loss [Shortness Of Breath] : no shortness of breath [Dyspnea on exertion] : not dyspnea during exertion [Chest  Pressure] : no chest pressure [Chest Pain] : no chest pain [Lower Ext Edema] : no extremity edema [Leg Claudication] : no intermittent leg claudication [Palpitations] : no palpitations [Heartburn] : no heartburn [Change in Appetite] : no change in appetite [Urinary Frequency] : no change in urinary frequency [Skin: A Rash] : no rash: [Dizziness] : no dizziness [Depression] : no depression [Under Stress] : not under stress

## 2020-09-22 ENCOUNTER — TRANSCRIPTION ENCOUNTER (OUTPATIENT)
Age: 60
End: 2020-09-22

## 2020-09-22 LAB
ALBUMIN SERPL ELPH-MCNC: 3.9 G/DL
ALP BLD-CCNC: 153 U/L
ALT SERPL-CCNC: 17 U/L
ANION GAP SERPL CALC-SCNC: 11 MMOL/L
AST SERPL-CCNC: 17 U/L
BASOPHILS # BLD AUTO: 0.03 K/UL
BASOPHILS NFR BLD AUTO: 0.3 %
BILIRUB SERPL-MCNC: 0.3 MG/DL
BUN SERPL-MCNC: 15 MG/DL
CALCIUM SERPL-MCNC: 8.8 MG/DL
CHLORIDE SERPL-SCNC: 96 MMOL/L
CHOLEST SERPL-MCNC: 126 MG/DL
CHOLEST/HDLC SERPL: 4 RATIO
CO2 SERPL-SCNC: 29 MMOL/L
CREAT SERPL-MCNC: 0.74 MG/DL
EOSINOPHIL # BLD AUTO: 0.12 K/UL
EOSINOPHIL NFR BLD AUTO: 1.3 %
ESTIMATED AVERAGE GLUCOSE: 180 MG/DL
GLUCOSE SERPL-MCNC: 202 MG/DL
HBA1C MFR BLD HPLC: 7.9 %
HCT VFR BLD CALC: 38.3 %
HDLC SERPL-MCNC: 31 MG/DL
HGB BLD-MCNC: 12.1 G/DL
IMM GRANULOCYTES NFR BLD AUTO: 0.9 %
LDLC SERPL CALC-MCNC: 78 MG/DL
LYMPHOCYTES # BLD AUTO: 1.99 K/UL
LYMPHOCYTES NFR BLD AUTO: 21.9 %
MAN DIFF?: NORMAL
MCHC RBC-ENTMCNC: 29.1 PG
MCHC RBC-ENTMCNC: 31.6 GM/DL
MCV RBC AUTO: 92.1 FL
MONOCYTES # BLD AUTO: 0.56 K/UL
MONOCYTES NFR BLD AUTO: 6.2 %
NEUTROPHILS # BLD AUTO: 6.29 K/UL
NEUTROPHILS NFR BLD AUTO: 69.4 %
PLATELET # BLD AUTO: 192 K/UL
POTASSIUM SERPL-SCNC: 4.5 MMOL/L
PROT SERPL-MCNC: 6.6 G/DL
RBC # BLD: 4.16 M/UL
RBC # FLD: 14 %
SARS-COV-2 IGG SERPL IA-ACNC: 0.08 INDEX
SARS-COV-2 IGG SERPL QL IA: NEGATIVE
SODIUM SERPL-SCNC: 136 MMOL/L
TRIGL SERPL-MCNC: 84 MG/DL
WBC # FLD AUTO: 9.07 K/UL

## 2020-10-16 ENCOUNTER — RX RENEWAL (OUTPATIENT)
Age: 60
End: 2020-10-16

## 2020-11-26 ENCOUNTER — RX RENEWAL (OUTPATIENT)
Age: 60
End: 2020-11-26

## 2020-12-10 ENCOUNTER — RX RENEWAL (OUTPATIENT)
Age: 60
End: 2020-12-10

## 2021-01-15 ENCOUNTER — RX RENEWAL (OUTPATIENT)
Age: 61
End: 2021-01-15

## 2021-03-03 ENCOUNTER — RX RENEWAL (OUTPATIENT)
Age: 61
End: 2021-03-03

## 2021-06-24 ENCOUNTER — APPOINTMENT (OUTPATIENT)
Dept: OTOLARYNGOLOGY | Facility: CLINIC | Age: 61
End: 2021-06-24
Payer: COMMERCIAL

## 2021-06-24 VITALS
TEMPERATURE: 97.5 F | HEART RATE: 84 BPM | HEIGHT: 67 IN | WEIGHT: 268 LBS | SYSTOLIC BLOOD PRESSURE: 119 MMHG | DIASTOLIC BLOOD PRESSURE: 68 MMHG | BODY MASS INDEX: 42.06 KG/M2

## 2021-06-24 DIAGNOSIS — H69.83 OTHER SPECIFIED DISORDERS OF EUSTACHIAN TUBE, BILATERAL: ICD-10-CM

## 2021-06-24 DIAGNOSIS — H90.3 SENSORINEURAL HEARING LOSS, BILATERAL: ICD-10-CM

## 2021-06-24 DIAGNOSIS — J34.2 DEVIATED NASAL SEPTUM: ICD-10-CM

## 2021-06-24 DIAGNOSIS — J31.0 CHRONIC RHINITIS: ICD-10-CM

## 2021-06-24 DIAGNOSIS — H61.23 IMPACTED CERUMEN, BILATERAL: ICD-10-CM

## 2021-06-24 PROCEDURE — 99213 OFFICE O/P EST LOW 20 MIN: CPT

## 2021-06-24 PROCEDURE — 99072 ADDL SUPL MATRL&STAF TM PHE: CPT

## 2021-06-24 NOTE — HISTORY OF PRESENT ILLNESS
[No] : patient does not have a  history of radiation therapy [Ear Fullness] : ear fullness [Otalgia] : otalgia [Eustachian Tube Dysfunction] : eustachian tube dysfunction [Loud Noise Exposure] : history of loud noise exposure [None] : No risk factors have been identified. [de-identified] : 61 yo male\par Patient complains last month he had right sided ear pain that now resolved but he is now experiencing clogged sensation in the right ear. h/o severe  nasal congestionw/ ear pain. Pt has no ear drainage, hearing loss, tinnitus, vertigo, nasal congestion, nasal discharge, epistaxis, sinus infections, facial pain, facial pressure, throat pain, dysphagia or fevers\par \par  [Cholesteatoma] : no cholesteatoma [Early Onset Hearing Loss] : no early onset hearing loss [Stroke] : no stroke [Allergic Rhinitis] : no allergic rhinitis [Adenoidectomy] : no adenoidectomy [Allergies] : no allergies [Asthma] : no asthma [Hyperthyroidism] : no hyperthyroidism [Sialadenitis] : no sialadenitis [Hodgkin Disease] : no hodgkin disease [Non-Hodgkin Lymphoma] : no non-hodgkin lymphoma [Graves Disease] : no graves disease [Thyroid Cancer] : no thyroid cancer

## 2021-06-24 NOTE — PHYSICAL EXAM
[Midline] : trachea located in midline position [] : septum deviated bilaterally [de-identified] : congested [Normal] : no rashes

## 2021-06-24 NOTE — END OF VISIT
[FreeTextEntry3] : I personally saw and examined MARTITA ALONSO in detail. I spoke to SUSIE Triana regarding the assessment and plan of care. I reviewed the above assessment and plan of care, and agree. I have made changes in changes in the body of the note where appropriate.I personally reviewed the HPI, PMH, FH, SH, ROS and medications/allergies. I have spoken to SUSIE Triana regarding the history and have personally determined the assessment and plan of care, and documented this myself. I was present and participated in all key portions of the encounter and all procedures noted above. I have made changes in the body of the note where appropriate.\par \par Attesting Faculty: See Attending Signature Below \par \par \par

## 2021-06-24 NOTE — ASSESSMENT
[FreeTextEntry1] : Patient complains he had right sided ear pain for a month but now resolved, now ghas clogged sensation in the right ear \par \par Clogged ear due to Nasal congestion\par DNS and Rhinitis and ETD\par Rx\par   Steam humidification and hypertonic saline nasal irrigations \par Rx-Advised for Aleve sinus

## 2021-07-29 ENCOUNTER — RX RENEWAL (OUTPATIENT)
Age: 61
End: 2021-07-29

## 2021-08-02 ENCOUNTER — RX RENEWAL (OUTPATIENT)
Age: 61
End: 2021-08-02

## 2021-10-05 ENCOUNTER — APPOINTMENT (OUTPATIENT)
Dept: INTERNAL MEDICINE | Facility: CLINIC | Age: 61
End: 2021-10-05
Payer: COMMERCIAL

## 2021-10-05 ENCOUNTER — NON-APPOINTMENT (OUTPATIENT)
Age: 61
End: 2021-10-05

## 2021-10-05 VITALS
DIASTOLIC BLOOD PRESSURE: 60 MMHG | TEMPERATURE: 97.16 F | BODY MASS INDEX: 41.95 KG/M2 | WEIGHT: 261 LBS | OXYGEN SATURATION: 97 % | HEIGHT: 66 IN | HEART RATE: 84 BPM | SYSTOLIC BLOOD PRESSURE: 126 MMHG

## 2021-10-05 DIAGNOSIS — R10.32 LEFT LOWER QUADRANT PAIN: ICD-10-CM

## 2021-10-05 PROCEDURE — 36415 COLL VENOUS BLD VENIPUNCTURE: CPT

## 2021-10-05 PROCEDURE — 93000 ELECTROCARDIOGRAM COMPLETE: CPT | Mod: 59

## 2021-10-05 PROCEDURE — G0444 DEPRESSION SCREEN ANNUAL: CPT | Mod: NC,59

## 2021-10-05 PROCEDURE — 99396 PREV VISIT EST AGE 40-64: CPT | Mod: 25

## 2021-10-05 RX ORDER — ROSUVASTATIN CALCIUM 40 MG/1
40 TABLET, FILM COATED ORAL
Refills: 0 | Status: ACTIVE | COMMUNITY

## 2021-10-05 RX ORDER — ATORVASTATIN CALCIUM 80 MG/1
80 TABLET, FILM COATED ORAL
Qty: 90 | Refills: 0 | Status: DISCONTINUED | COMMUNITY
Start: 2018-10-09 | End: 2021-10-05

## 2021-10-07 ENCOUNTER — RX RENEWAL (OUTPATIENT)
Age: 61
End: 2021-10-07

## 2021-10-11 NOTE — PHYSICAL EXAM
[Well Nourished] : well nourished [Well Developed] : well developed [Well-Appearing] : well-appearing [Normal Voice/Communication] : normal voice/communication [Normal Sclera/Conjunctiva] : normal sclera/conjunctiva [PERRL] : pupils equal round and reactive to light [Normal Outer Ear/Nose] : the outer ears and nose were normal in appearance [Normal Oropharynx] : the oropharynx was normal [Normal TMs] : both tympanic membranes were normal [No JVD] : no jugular venous distention [No Lymphadenopathy] : no lymphadenopathy [Supple] : supple [Thyroid Normal, No Nodules] : the thyroid was normal and there were no nodules present [No Respiratory Distress] : no respiratory distress  [No Accessory Muscle Use] : no accessory muscle use [Clear to Auscultation] : lungs were clear to auscultation bilaterally [Normal Rate] : normal rate  [Regular Rhythm] : with a regular rhythm [Normal S1, S2] : normal S1 and S2 [No Murmur] : no murmur heard [No Carotid Bruits] : no carotid bruits [No Abdominal Bruit] : a ~M bruit was not heard ~T in the abdomen [No Varicosities] : no varicosities [Pedal Pulses Present] : the pedal pulses are present [No Edema] : there was no peripheral edema [No Extremity Clubbing/Cyanosis] : no extremity clubbing/cyanosis [Soft] : abdomen soft [Non-distended] : non-distended [Normal Bowel Sounds] : normal bowel sounds [Normal Supraclavicular Nodes] : no supraclavicular lymphadenopathy [Normal Posterior Cervical Nodes] : no posterior cervical lymphadenopathy [Normal Anterior Cervical Nodes] : no anterior cervical lymphadenopathy [No CVA Tenderness] : no CVA  tenderness [No Spinal Tenderness] : no spinal tenderness [No Joint Swelling] : no joint swelling [Grossly Normal Strength/Tone] : grossly normal strength/tone [No Rash] : no rash [Coordination Grossly Intact] : coordination grossly intact [No Focal Deficits] : no focal deficits [Normal Gait] : normal gait [Speech Grossly Normal] : speech grossly normal [Normal Affect] : the affect was normal [Alert and Oriented x3] : oriented to person, place, and time [Normal Mood] : the mood was normal [Normal Insight/Judgement] : insight and judgment were intact [de-identified] : morbidly obese , significant tenderness to palpation in LLQ , no guarding or rebound

## 2021-10-11 NOTE — ASSESSMENT
[FreeTextEntry1] : discussed w pt and his wife who is present \par \par reviewed episode of apparently severe LLQ pain about 2 weeks ago. he did not seek care at that time. pain mostly resolved but he describes intermittent mild pain. significant tenderness currently on exam in LLQ. he has had screening colonoscopy. \par explained to pt that differential would include colitis vs diverticulitis episodes. he may be at risk for both given obesity. ordered CT abd/pelvis w contrast for further evaluation. if pain recurs, he is to call and will initiate empiric antibiotics if needed.\par check full labs as below, will include amylase, lipase. \par \par discussed likely BPH symptoms w some LUTS. reviewed that he may benefit from starting tamsulosin to improve urinary flow. start rx, reviewed w pt. \par \par cont current rx \par \par he had COVID vaccines x 2 doses \par Tdap vaccine UTD \par \par RTO 2-3 weeks for f/u for review of progress after CT abd/pelvis. reminded him if he develops acute severe episode of abd pain he should call or proceed to ER. \par \par

## 2021-10-11 NOTE — HISTORY OF PRESENT ILLNESS
[de-identified] : 61 y/o man presents for annual physical exam. he feels well overall currently, but wanted to discuss an episode of severe LLQ pain radiated to L flank which occurred about 2 weeks ago. started around the time of a meal. no change in stool function or urination at that time. no hematochezia or melena. no hx of renal stones. pain mostly resolved after about one day. he did not seek care at that time. he still notes occasional LLQ discomfort. he has had prior colonoscopy. he is not sure whether he has had diverticulosis noted on past colonoscopies. no prior episodes of diverticulitis. \par \par he also would like to discuss nocturia with hesitancy which is becoming more bothersome. no dysuria, hematuria. \par \par chronic conditions significant for type II DM on rx, following w Dr Hurley ,endocrinologist regularly. on Tresiba, Ozempic inj, metformin, Novolog prn, ACE-I\par hyperlipidemia on statin \par obesity unchanged \par chronic GERD on PPI \par \par seasonal allergies on antihistamine regularly during allergy season \par \par last colonoscopy screening in 2018 w Dr Olivarez GI, pt recalls normal results

## 2021-10-11 NOTE — REVIEW OF SYSTEMS
[Negative] : Heme/Lymph [Hesitancy] : hesitancy [Nocturia] : nocturia [Abdominal Pain] : no abdominal pain [Nausea] : no nausea [Diarrhea] : no diarrhea [Vomiting] : no vomiting [Melena] : no melena [Dysuria] : no dysuria [Incontinence] : no incontinence [Hematuria] : no hematuria [Frequency] : no frequency

## 2021-10-12 ENCOUNTER — APPOINTMENT (OUTPATIENT)
Dept: CT IMAGING | Facility: IMAGING CENTER | Age: 61
End: 2021-10-12
Payer: COMMERCIAL

## 2021-10-12 ENCOUNTER — OUTPATIENT (OUTPATIENT)
Dept: OUTPATIENT SERVICES | Facility: HOSPITAL | Age: 61
LOS: 1 days | End: 2021-10-12
Payer: COMMERCIAL

## 2021-10-12 DIAGNOSIS — R10.32 LEFT LOWER QUADRANT PAIN: ICD-10-CM

## 2021-10-12 DIAGNOSIS — Z00.8 ENCOUNTER FOR OTHER GENERAL EXAMINATION: ICD-10-CM

## 2021-10-12 PROCEDURE — 74177 CT ABD & PELVIS W/CONTRAST: CPT

## 2021-10-12 PROCEDURE — 74177 CT ABD & PELVIS W/CONTRAST: CPT | Mod: 26

## 2021-11-16 LAB
ALBUMIN SERPL ELPH-MCNC: 4.3 G/DL
ALP BLD-CCNC: 149 U/L
ALT SERPL-CCNC: 24 U/L
AMYLASE/CREAT SERPL: 60 U/L
ANION GAP SERPL CALC-SCNC: 11 MMOL/L
APPEARANCE: CLEAR
AST SERPL-CCNC: 17 U/L
BACTERIA UR CULT: NORMAL
BASOPHILS # BLD AUTO: 0.05 K/UL
BASOPHILS NFR BLD AUTO: 0.4 %
BILIRUB SERPL-MCNC: 0.4 MG/DL
BILIRUBIN URINE: NEGATIVE
BLOOD URINE: NEGATIVE
BUN SERPL-MCNC: 16 MG/DL
CALCIUM SERPL-MCNC: 9.5 MG/DL
CHLORIDE SERPL-SCNC: 100 MMOL/L
CHOLEST SERPL-MCNC: 137 MG/DL
CO2 SERPL-SCNC: 31 MMOL/L
COLOR: YELLOW
CREAT SERPL-MCNC: 0.77 MG/DL
EOSINOPHIL # BLD AUTO: 0.29 K/UL
EOSINOPHIL NFR BLD AUTO: 2.4 %
ESTIMATED AVERAGE GLUCOSE: 200 MG/DL
GLUCOSE QUALITATIVE U: ABNORMAL
GLUCOSE SERPL-MCNC: 89 MG/DL
HBA1C MFR BLD HPLC: 8.6 %
HCT VFR BLD CALC: 42.1 %
HDLC SERPL-MCNC: 36 MG/DL
HGB BLD-MCNC: 13.1 G/DL
IMM GRANULOCYTES NFR BLD AUTO: 0.8 %
KETONES URINE: NEGATIVE
LDLC SERPL CALC-MCNC: 81 MG/DL
LEUKOCYTE ESTERASE URINE: NEGATIVE
LPL SERPL-CCNC: 124 U/L
LYMPHOCYTES # BLD AUTO: 2.46 K/UL
LYMPHOCYTES NFR BLD AUTO: 20.3 %
MAN DIFF?: NORMAL
MCHC RBC-ENTMCNC: 28.9 PG
MCHC RBC-ENTMCNC: 31.1 GM/DL
MCV RBC AUTO: 92.9 FL
MONOCYTES # BLD AUTO: 0.63 K/UL
MONOCYTES NFR BLD AUTO: 5.2 %
NEUTROPHILS # BLD AUTO: 8.58 K/UL
NEUTROPHILS NFR BLD AUTO: 70.9 %
NITRITE URINE: NEGATIVE
NONHDLC SERPL-MCNC: 101 MG/DL
PH URINE: 6
PLATELET # BLD AUTO: 264 K/UL
POTASSIUM SERPL-SCNC: 4.4 MMOL/L
PROT SERPL-MCNC: 7.3 G/DL
PROTEIN URINE: NEGATIVE
PSA SERPL-MCNC: 0.72 NG/ML
RBC # BLD: 4.53 M/UL
RBC # FLD: 14.3 %
SODIUM SERPL-SCNC: 141 MMOL/L
SPECIFIC GRAVITY URINE: 1.03
T4 FREE SERPL-MCNC: 1.3 NG/DL
TRIGL SERPL-MCNC: 103 MG/DL
TSH SERPL-ACNC: 2.66 UIU/ML
UROBILINOGEN URINE: NORMAL
WBC # FLD AUTO: 12.11 K/UL

## 2022-01-02 ENCOUNTER — RX RENEWAL (OUTPATIENT)
Age: 62
End: 2022-01-02

## 2022-01-18 ENCOUNTER — APPOINTMENT (OUTPATIENT)
Dept: INTERNAL MEDICINE | Facility: CLINIC | Age: 62
End: 2022-01-18

## 2022-04-19 ENCOUNTER — RX RENEWAL (OUTPATIENT)
Age: 62
End: 2022-04-19

## 2022-05-16 ENCOUNTER — APPOINTMENT (OUTPATIENT)
Dept: INTERNAL MEDICINE | Facility: CLINIC | Age: 62
End: 2022-05-16

## 2022-06-22 ENCOUNTER — RX RENEWAL (OUTPATIENT)
Age: 62
End: 2022-06-22

## 2022-07-10 ENCOUNTER — RX RENEWAL (OUTPATIENT)
Age: 62
End: 2022-07-10

## 2022-11-10 ENCOUNTER — RX RENEWAL (OUTPATIENT)
Age: 62
End: 2022-11-10

## 2022-11-12 ENCOUNTER — RX RENEWAL (OUTPATIENT)
Age: 62
End: 2022-11-12

## 2022-11-12 RX ORDER — RAMIPRIL 5 MG/1
5 CAPSULE ORAL
Qty: 90 | Refills: 0 | Status: ACTIVE | COMMUNITY
Start: 2018-01-22 | End: 1900-01-01

## 2023-01-27 ENCOUNTER — RX RENEWAL (OUTPATIENT)
Age: 63
End: 2023-01-27

## 2023-09-11 ENCOUNTER — RX RENEWAL (OUTPATIENT)
Age: 63
End: 2023-09-11

## 2023-09-11 RX ORDER — OMEPRAZOLE 40 MG/1
40 CAPSULE, DELAYED RELEASE ORAL
Qty: 90 | Refills: 2 | Status: ACTIVE | COMMUNITY
Start: 2020-05-12 | End: 1900-01-01

## 2023-11-28 ENCOUNTER — EMERGENCY (EMERGENCY)
Facility: HOSPITAL | Age: 63
LOS: 1 days | Discharge: HOME | End: 2023-11-28
Attending: EMERGENCY MEDICINE | Admitting: EMERGENCY MEDICINE
Payer: COMMERCIAL

## 2023-11-28 VITALS
HEART RATE: 82 BPM | TEMPERATURE: 99 F | SYSTOLIC BLOOD PRESSURE: 129 MMHG | OXYGEN SATURATION: 96 % | RESPIRATION RATE: 18 BRPM | WEIGHT: 259.93 LBS | HEIGHT: 68 IN | DIASTOLIC BLOOD PRESSURE: 67 MMHG

## 2023-11-28 PROCEDURE — 73610 X-RAY EXAM OF ANKLE: CPT | Mod: 26,LT

## 2023-11-28 PROCEDURE — 99284 EMERGENCY DEPT VISIT MOD MDM: CPT

## 2023-11-28 RX ORDER — IBUPROFEN 200 MG
1 TABLET ORAL
Qty: 30 | Refills: 0
Start: 2023-11-28

## 2023-11-28 RX ORDER — IBUPROFEN 200 MG
800 TABLET ORAL ONCE
Refills: 0 | Status: COMPLETED | OUTPATIENT
Start: 2023-11-28 | End: 2023-11-28

## 2023-11-28 RX ADMIN — Medication 800 MILLIGRAM(S): at 23:08

## 2023-11-28 NOTE — ED PROVIDER NOTE - PHYSICAL EXAMINATION
exam:   General: well appearing, NAD.   HEENT: eyes perrl,   cor: RRR, s1s2, 2+rad pulses.   lungs: ctabl, no resp distress.   abd: soft, ntnd.   neuro: a&ox3, cn2-12 intact, MAGANA, 5/5 strength c nl sensation all extremities, nl coordination.   MSK: Left ankle with mild focal lateral malleolus tenderness with slight swelling.  No discoloration or increased warmth.  Pain with extreme plantarflexion.  No pain with dorsiflexion.  2+ DP.  Foot nontender  Skin: normal, no rash

## 2023-11-28 NOTE — ED ADULT NURSE NOTE - OBJECTIVE STATEMENT
Pt presents to the ER complaining of left ankle pain that started on Sunday but has gotten progressively worse where it is difficult to bear weight. Pt denies any injury to that ankle. A&OX4.

## 2023-11-28 NOTE — ED PROVIDER NOTE - PATIENT PORTAL LINK FT
You can access the FollowMyHealth Patient Portal offered by [facility] by registering at the following website: [email or web address]. By joining [employer]’s FollowMyHealth portal, you will also be able to view your health information using other applications (apps) compatible with our system.

## 2023-11-28 NOTE — ED PROVIDER NOTE - CLINICAL SUMMARY MEDICAL DECISION MAKING FREE TEXT BOX
62-year-old male with history of diabetes complaining of left lateral ankle pain for the last 3 days, gradually worsening.  Associated with slight swelling of ankle.  No recalled trauma.  Pain worse with walking and bearing weight.  No chest pain or shortness of breath.  No fever or chills.  No other joint pain    Patient no distress.  Vitals unremarkable.  Left ankle with mild focal tenderness lateral ankle/malleolus.  Check x-ray rule out fracture versus arthritis, crystal arthropathy, sprain.  Motrin for pain.    Update: X-ray unremarkable.  Patient reports improvement after Motrin.  Patient placed in Aircast splint.  Follow-up Ortho 40

## 2023-11-28 NOTE — ED PROVIDER NOTE - CARE PROVIDER_API CALL
Antoine Dudley  Orthopaedic Surgery  [address], [address]  [place] [address]  Phone: [phone]  Fax: [phone]  Follow Up Time: 1-3 Days

## 2023-11-28 NOTE — ED PROVIDER NOTE - OBJECTIVE STATEMENT
62-year-old male with history of diabetes complaining of left lateral ankle pain for the last 3 days, gradually worsening.  Associated with slight swelling of ankle.  No recalled trauma.  Pain worse with walking and bearing weight.  No chest pain or shortness of breath.  No fever or chills.  No other joint pain

## 2023-11-28 NOTE — ED PROVIDER NOTE - NSFOLLOWUPINSTRUCTIONS_ED_ALL_ED_FT
-Wear Aircast ankle splint for support  -Take ibuprofen 600 mg every 6 hours as needed for pain  -Follow-up with orthopedist this week  -Return for worse pain swelling, fever, difficult breathing, or other concerns

## 2023-11-29 VITALS
OXYGEN SATURATION: 97 % | HEART RATE: 80 BPM | SYSTOLIC BLOOD PRESSURE: 122 MMHG | DIASTOLIC BLOOD PRESSURE: 71 MMHG | RESPIRATION RATE: 19 BRPM | TEMPERATURE: 98 F

## 2023-11-29 PROCEDURE — 73610 X-RAY EXAM OF ANKLE: CPT

## 2023-11-29 PROCEDURE — 99283 EMERGENCY DEPT VISIT LOW MDM: CPT

## 2023-11-29 NOTE — ED PROCEDURE NOTE - NS ED PERI VASCULAR NEG
fingers/toes warm to touch/no paresthesia/no swelling/no cyanosis of extremity/capillary refill time < 2 seconds Walk in [employer]

## 2024-02-08 ENCOUNTER — NON-APPOINTMENT (OUTPATIENT)
Age: 64
End: 2024-02-08

## 2024-02-08 ENCOUNTER — APPOINTMENT (OUTPATIENT)
Dept: CARDIOLOGY | Facility: CLINIC | Age: 64
End: 2024-02-08
Payer: COMMERCIAL

## 2024-02-08 VITALS
WEIGHT: 253 LBS | SYSTOLIC BLOOD PRESSURE: 106 MMHG | OXYGEN SATURATION: 96 % | HEIGHT: 66 IN | BODY MASS INDEX: 40.66 KG/M2 | RESPIRATION RATE: 17 BRPM | HEART RATE: 84 BPM | DIASTOLIC BLOOD PRESSURE: 58 MMHG

## 2024-02-08 DIAGNOSIS — Z12.5 ENCOUNTER FOR SCREENING FOR MALIGNANT NEOPLASM OF PROSTATE: ICD-10-CM

## 2024-02-08 PROBLEM — E11.9 TYPE 2 DIABETES MELLITUS WITHOUT COMPLICATIONS: Chronic | Status: ACTIVE | Noted: 2023-11-29

## 2024-02-08 PROCEDURE — G2211 COMPLEX E/M VISIT ADD ON: CPT | Mod: NC,1L

## 2024-02-08 PROCEDURE — 99204 OFFICE O/P NEW MOD 45 MIN: CPT | Mod: 25

## 2024-02-08 PROCEDURE — 93000 ELECTROCARDIOGRAM COMPLETE: CPT

## 2024-02-08 RX ORDER — ATORVASTATIN CALCIUM 80 MG/1
80 TABLET, FILM COATED ORAL
Qty: 90 | Refills: 0 | Status: DISCONTINUED | COMMUNITY
Start: 2021-10-11 | End: 2024-02-08

## 2024-02-08 RX ORDER — EMPAGLIFLOZIN 25 MG/1
25 TABLET, FILM COATED ORAL DAILY
Qty: 1 | Refills: 1 | Status: ACTIVE | COMMUNITY
Start: 2024-02-08

## 2024-02-08 RX ORDER — METFORMIN HYDROCHLORIDE 1000 MG/1
1000 TABLET, COATED ORAL
Refills: 3 | Status: ACTIVE | COMMUNITY

## 2024-02-08 NOTE — ASSESSMENT
[FreeTextEntry1] : Has multiple risk factors for coronary disease, still drinking more than just socially, still smokes cigars but does not inhale and no cigarettes.  Again surprised that he had "normal coronary arteries" but his blood pressure is also excellent on his current regimen and his labs in the past have been pretty good on his current regimen.  He denies exertional chest pain and any exertional shortness of breath seems to be the same as his baseline based on his weight.  Exam unremarkable and EKG within normal limits.  Long discussion with the patient and his wife we agreed to schedule a coronary artery calcium score.  Certainly if the score came back very high he should have a stress test etc.  If the score is somewhere in the middle then he does need to be more aggressive about his risk factor control and weight loss with his score is extremely low and we will probably just continue the current regimen.  Labs were sent including lipids and hemoglobin A1c.  Repeat PSA was sent as well and then he will follow-up with urology if abnormally high.  They are asking for a new endocrinologist as well.

## 2024-02-08 NOTE — REASON FOR VISIT
[FreeTextEntry1] : Had "normal" cardiac cath in [facility] for exertional chest pain. Now here for first visit in over 3 years.

## 2024-02-08 NOTE — REVIEW OF SYSTEMS
[Negative] : Heme/Lymph [Weight Gain (___ Lbs)] : no recent weight gain [Feeling Fatigued] : not feeling fatigued [Weight Loss (___ Lbs)] : [unfilled] ~Ulb weight loss [SOB] : no shortness of breath [Dyspnea on exertion] : not dyspnea during exertion [Chest Discomfort] : no chest discomfort [Lower Ext Edema] : no extremity edema [Leg Claudication] : no intermittent leg claudication [Palpitations] : no palpitations [Syncope] : no syncope [Heartburn] : no heartburn [Change in Appetite] : no change in appetite [Rash] : no rash [Dizziness] : no dizziness [Depression] : no depression [Under Stress] : not under stress

## 2024-02-08 NOTE — HISTORY OF PRESENT ILLNESS
[FreeTextEntry1] : June 9, 2014. I first saw the patient in 2004 when he was 43 years old. At that point he was overweight with diabetes and hyperlipidemia and had stopped smoking 2 years prior he came because of an episode of passing out while on the plane after he got up to go to the bathroom. His past history had been unremarkable other than some minor surgery. He had a story compatible with acid reflux. The assumption was vasopressor syncope although he did have a sestamibi stress test on March 15 of 2004. That showed no ischemia to a heart rate of 169 with a left ventricular ejection fraction of 58%. The patient had another episode after stomach upset in July while on the airplane. He was recommended to have a tilt table test and consider beta blocker. I did not see him again until 2008. At that point he was having chest pain although he was under a lot of stress. He is being followed by Dr. Reshma Nichols for his diabetes. August of 2090 seemed to have some exertional chest pain although part of it was just getting up from a chair. This progressed until October after doing some minor building around the house he had chest tightness diffusely. EKG was unremarkable. He has next rest test was not until March of 2010 and again sestamibi scan was negative with a left ventricular ejection fraction of 63% the patient reached a heart rate of 167 achieving 14 METs. In February of 2011 he noticed chest tightness walking in the hills in [place]. Again exam and EKG were unremarkable. He did not return for a stress echo until a year later in February 2012 and again this seemed to be negative although the echo was suboptimal due to body habitus. He did reach a heart rate of 160 without EKG changes or symptoms. In August 2013 he had chest pain one night prior to leaving on a cruise. He came in for a stress echocardiogram which again showed no ST changes to a heart rate of 132 and 10 METs. Echocardiogram was only slightly suboptimal and showed no wall motion abnormalities. June 9, 2014. About one week ago walking to [place] he noticed exertional chest tightness relieved with rest. However this has not recurred. 2 days ago he noticed significant swelling of his ankles. This happened to him once when he was in [place] and he was taken to a physician there who gave him hydrochlorothiazide and it resolved. The patient took his own hydrochlorothiazide and this seemed to improve somewhat but he is here now because he is anxious about these findings. He has been walking normally and even exercising a little bit since the chest pain episode and it has not recurred. In the office he is asymptomatic though slightly anxious. His EKG is totally within normal limits. January 22, 2018. In November the patient was in [place] and had classic exertional chest pressure walking uphill. Went to the hospital and was admitted, ruled out, coronary angiography totally normal and he was sent home. He is here now for followup. I reviewed the angiogram, and there is probably plaquing in the LAD, but no obstructive disease. He is currently seeing the endocrinology group of Dr. Ellsworth, etc. He is on Crestor, aspirin, and metformin, along with insulin. He was given a prescription in [place] for ramipril, but they did not fill it. He had been on Invokana in the past, but it made him run to the bathroom, way too much. Since being back he has had  rare exertional chest pain, usually after smoking a cigar. Otherwise, he is active and asymptomatic. He claims his labs recently, and his blood pressure at endocrine or excellent. (Normal TSH, and vitamin D 16, BUN 22, creatinine 0.5, 5, and potassium 4.7, normal LFTs, cholesterol 142, HDL 37, LDL 88, triglycerides 131. Hemoglobin A1c 7.3.) May 7, 2018. Patient is here in followup. Was in [place] in the interim. Has lost 18 pounds, but says that he had gained in between and lost even more since. No chest pain or shortness of breath. EKG within the normal limits. No change in his cardiac medicines with just a little change in his long-acting insulin. Patient's issue now seems to be hemorrhoids, and he will be seeing Dr. Olivarez and then Dr. Mcgee. March 9, 2020.  Almost 2 years since he was here.  Is now on atorvastatin 80, Ramipril 5, and aspirin 81 mg.  Otherwise is on 3 different types of insulin.  Weight has gone back up from 260-270.  Only gets exertional chest pain if he takes a little too much scotch usually on Friday night and then goes walking uphill.  This would suggest that his classic exertional chest pain with normal coronary arteries was probably exercise-induced esophageal reflux.  In any case with his risk factors he still needs close monitoring of his cardiovascular system.  Most of the time he is asymptomatic and his EKG has sinus rhythm and is within normal limits.  His blood pressure is under good control.  There may have been a mild LAD plaque at the time of his angiogram.. September 21, 2020.  Patient here in follow-up.  Denies chest pain or shortness of breath.  Blood pressure well controlled.  EKG is sinus bradycardia at 58 and within normal limits.  Patient is overweight but unchanged.  Had labs with Dr. Christianson with hemoglobin A1c 7.0 back in May, normal renal function, cholesterol 124, HDL 34, LDL 76.  February 8, 2024.  First visit in 3-1/2 years.  Since his episode that led to an angiogram and is real that came back with normal coronary arteries he has been remarkably stable despite his weight, diabetes, hyperlipidemia etc.  Still drinking alcohol on a moderate level.  Denies exertional chest pain or shortness of breath.  Reviewed all his medications.  Still on baby aspirin and unsure what the indication is for that at this point.  His blood pressure is excellent his EKG is totally within normal limits.  By the end of the visit we decided to have him schedule a coronary artery calcium score.

## 2024-02-08 NOTE — DISCUSSION/SUMMARY
[FreeTextEntry1] : Labs sent.  Schedule coronary artery calcium score.  Follow-up depending on those results. [EKG obtained to assist in diagnosis and management of assessed problem(s)] : EKG obtained to assist in diagnosis and management of assessed problem(s)

## 2024-02-08 NOTE — HISTORY OF PRESENT ILLNESS
[FreeTextEntry1] : June 9, 2014. I first saw the patient in 2004 when he was 43 years old. At that point he was overweight with diabetes and hyperlipidemia and had stopped smoking 2 years prior he came because of an episode of passing out while on the plane after he got up to go to the bathroom. His past history had been unremarkable other than some minor surgery. He had a story compatible with acid reflux. The assumption was vasopressor syncope although he did have a sestamibi stress test on March 15 of 2004. That showed no ischemia to a heart rate of 169 with a left ventricular ejection fraction of 58%. The patient had another episode after stomach upset in July while on the airplane. He was recommended to have a tilt table test and consider beta blocker. I did not see him again until 2008. At that point he was having chest pain although he was under a lot of stress. He is being followed by Dr. Reshma Nichols for his diabetes. August of 2090 seemed to have some exertional chest pain although part of it was just getting up from a chair. This progressed until October after doing some minor building around the house he had chest tightness diffusely. EKG was unremarkable. He has next rest test was not until March of 2010 and again sestamibi scan was negative with a left ventricular ejection fraction of 63% the patient reached a heart rate of 167 achieving 14 METs. In February of 2011 he noticed chest tightness walking in the hills in [place]. Again exam and EKG were unremarkable. He did not return for a stress echo until a year later in February 2012 and again this seemed to be negative although the echo was suboptimal due to body habitus. He did reach a heart rate of 160 without EKG changes or symptoms. In August 2013 he had chest pain one night prior to leaving on a cruise. He came in for a stress echocardiogram which again showed no ST changes to a heart rate of 132 and 10 METs. Echocardiogram was only slightly suboptimal and showed no wall motion abnormalities. June 9, 2014. About one week ago walking to [place] he noticed exertional chest tightness relieved with rest. However this has not recurred. 2 days ago he noticed significant swelling of his ankles. This happened to him once when he was in [place] and he was taken to a physician there who gave him hydrochlorothiazide and it resolved. The patient took his own hydrochlorothiazide and this seemed to improve somewhat but he is here now because he is anxious about these findings. He has been walking normally and even exercising a little bit since the chest pain episode and it has not recurred. In the office he is asymptomatic though slightly anxious. His EKG is totally within normal limits. January 22, 2018. In November the patient was in [place] and had classic exertional chest pressure walking uphill. Went to the hospital and was admitted, ruled out, coronary angiography totally normal and he was sent home. He is here now for followup. I reviewed the angiogram, and there is probably plaquing in the LAD, but no obstructive disease. He is currently seeing the endocrinology group of Dr. Ellsworth, etc. He is on Crestor, aspirin, and metformin, along with insulin. He was given a prescription in [place] for ramipril, but they did not fill it. He had been on Invokana in the past, but it made him run to the bathroom, way too much. Since being back he has had  rare exertional chest pain, usually after smoking a cigar. Otherwise, he is active and asymptomatic. He claims his labs recently, and his blood pressure at endocrine or excellent. (Normal TSH, and vitamin D 16, BUN 22, creatinine 0.5, 5, and potassium 4.7, normal LFTs, cholesterol 142, HDL 37, LDL 88, triglycerides 131. Hemoglobin A1c 7.3.) May 7, 2018. Patient is here in followup. Was in [place] in the interim. Has lost 18 pounds, but says that he had gained in between and lost even more since. No chest pain or shortness of breath. EKG within the normal limits. No change in his cardiac medicines with just a little change in his long-acting insulin. Patient's issue now seems to be hemorrhoids, and he will be seeing Dr. Olivarez and then Dr. Mcgee. March 9, 2020.  Almost 2 years since he was here.  Is now on atorvastatin 80, Ramipril 5, and aspirin 81 mg.  Otherwise is on 3 different types of insulin.  Weight has gone back up from 260-270.  Only gets exertional chest pain if he takes a little too much scotch usually on Friday night and then goes walking uphill.  This would suggest that his classic exertional chest pain with normal coronary arteries was probably exercise-induced esophageal reflux.  In any case with his risk factors he still needs close monitoring of his cardiovascular system.  Most of the time he is asymptomatic and his EKG has sinus rhythm and is within normal limits.  His blood pressure is under good control.  There may have been a mild LAD plaque at the time of his angiogram.. September 21, 2020.  Patient here in follow-up.  Denies chest pain or shortness of breath.  Blood pressure well controlled.  EKG is sinus bradycardia at 58 and within normal limits.  Patient is overweight but unchanged.  Had labs with Dr. Christianson with hemoglobin A1c 7.0 back in May, normal renal function, cholesterol 124, HDL 34, LDL 76.  February 8, 2024.  First visit in 3-1/2 years.  Since his episode that led to an angiogram and is real that came back with normal coronary arteries he has been remarkably stable despite his weight, diabetes, hyperlipidemia etc.  Still drinking alcohol on a moderate level.  Denies exertional chest pain or shortness of breath.  Reviewed all his medications.  Still on baby aspirin and unsure what the indication is for that at this point.  His blood pressure is excellent his EKG is totally within normal limits.  By the end of the visit we decided to have him schedule a coronary artery calcium score.

## 2024-02-08 NOTE — REASON FOR VISIT
[FreeTextEntry1] : Had "normal" cardiac cath in [facility] for exertional chest pain. Now here for first visit in over 3 years.

## 2024-02-09 LAB
ALBUMIN SERPL ELPH-MCNC: 4.3 G/DL
ALP BLD-CCNC: 124 U/L
ALT SERPL-CCNC: 16 U/L
ANION GAP SERPL CALC-SCNC: 15 MMOL/L
AST SERPL-CCNC: 14 U/L
BILIRUB SERPL-MCNC: 0.3 MG/DL
BUN SERPL-MCNC: 20 MG/DL
CALCIUM SERPL-MCNC: 9.6 MG/DL
CHLORIDE SERPL-SCNC: 100 MMOL/L
CHOLEST SERPL-MCNC: 136 MG/DL
CO2 SERPL-SCNC: 25 MMOL/L
CREAT SERPL-MCNC: 0.86 MG/DL
EGFR: 98 ML/MIN/1.73M2
ESTIMATED AVERAGE GLUCOSE: 183 MG/DL
GLUCOSE SERPL-MCNC: 239 MG/DL
HBA1C MFR BLD HPLC: 8 %
HCT VFR BLD CALC: 40.9 %
HDLC SERPL-MCNC: 33 MG/DL
HGB BLD-MCNC: 13.1 G/DL
LDLC SERPL CALC-MCNC: 75 MG/DL
MCHC RBC-ENTMCNC: 27.6 PG
MCHC RBC-ENTMCNC: 32 GM/DL
MCV RBC AUTO: 86.3 FL
NONHDLC SERPL-MCNC: 102 MG/DL
PLATELET # BLD AUTO: 288 K/UL
POTASSIUM SERPL-SCNC: 4.8 MMOL/L
PROT SERPL-MCNC: 7.3 G/DL
PSA SERPL-MCNC: 0.62 NG/ML
RBC # BLD: 4.74 M/UL
RBC # FLD: 14.9 %
SODIUM SERPL-SCNC: 139 MMOL/L
TRIGL SERPL-MCNC: 155 MG/DL
WBC # FLD AUTO: 9.73 K/UL

## 2024-02-12 ENCOUNTER — APPOINTMENT (OUTPATIENT)
Dept: CT IMAGING | Facility: CLINIC | Age: 64
End: 2024-02-12

## 2024-02-12 ENCOUNTER — OUTPATIENT (OUTPATIENT)
Dept: OUTPATIENT SERVICES | Facility: HOSPITAL | Age: 64
LOS: 1 days | End: 2024-02-12
Payer: SELF-PAY

## 2024-02-12 DIAGNOSIS — E11.9 TYPE 2 DIABETES MELLITUS WITHOUT COMPLICATIONS: ICD-10-CM

## 2024-02-12 PROCEDURE — 75571 CT HRT W/O DYE W/CA TEST: CPT | Mod: 26

## 2024-02-12 PROCEDURE — 75571 CT HRT W/O DYE W/CA TEST: CPT

## 2024-02-13 ENCOUNTER — NON-APPOINTMENT (OUTPATIENT)
Age: 64
End: 2024-02-13

## 2024-02-21 ENCOUNTER — APPOINTMENT (OUTPATIENT)
Dept: INTERNAL MEDICINE | Facility: CLINIC | Age: 64
End: 2024-02-21
Payer: COMMERCIAL

## 2024-02-21 VITALS
HEART RATE: 84 BPM | OXYGEN SATURATION: 97 % | SYSTOLIC BLOOD PRESSURE: 120 MMHG | WEIGHT: 252 LBS | DIASTOLIC BLOOD PRESSURE: 64 MMHG | TEMPERATURE: 207.68 F | BODY MASS INDEX: 40.5 KG/M2 | HEIGHT: 66 IN

## 2024-02-21 DIAGNOSIS — L30.9 DERMATITIS, UNSPECIFIED: ICD-10-CM

## 2024-02-21 DIAGNOSIS — Z00.00 ENCOUNTER FOR GENERAL ADULT MEDICAL EXAMINATION W/OUT ABNORMAL FINDINGS: ICD-10-CM

## 2024-02-21 DIAGNOSIS — E66.9 OBESITY, UNSPECIFIED: ICD-10-CM

## 2024-02-21 DIAGNOSIS — N40.0 BENIGN PROSTATIC HYPERPLASIA WITHOUT LOWER URINARY TRACT SYMPMS: ICD-10-CM

## 2024-02-21 PROCEDURE — 99396 PREV VISIT EST AGE 40-64: CPT

## 2024-02-21 RX ORDER — SEMAGLUTIDE 1.34 MG/ML
2 INJECTION, SOLUTION SUBCUTANEOUS
Qty: 9 | Refills: 0 | Status: DISCONTINUED | COMMUNITY
Start: 2019-08-14 | End: 2024-02-21

## 2024-02-21 RX ORDER — TAMSULOSIN HYDROCHLORIDE 0.4 MG/1
0.4 CAPSULE ORAL
Qty: 30 | Refills: 2 | Status: DISCONTINUED | COMMUNITY
Start: 2021-10-05 | End: 2024-02-21

## 2024-02-21 RX ORDER — LEVOCETIRIZINE DIHYDROCHLORIDE 5 MG/1
5 TABLET ORAL
Qty: 90 | Refills: 1 | Status: DISCONTINUED | COMMUNITY
Start: 2020-05-12 | End: 2024-02-21

## 2024-02-21 RX ORDER — CLOTRIMAZOLE AND BETAMETHASONE DIPROPIONATE 10; .5 MG/G; MG/G
1-0.05 CREAM TOPICAL TWICE DAILY
Qty: 1 | Refills: 1 | Status: ACTIVE | COMMUNITY
Start: 2019-10-05 | End: 1900-01-01

## 2024-03-21 LAB
APPEARANCE: CLEAR
BACTERIA: NEGATIVE /HPF
BILIRUBIN URINE: NEGATIVE
BLOOD URINE: NEGATIVE
CAST: 0 /LPF
COLOR: YELLOW
EPITHELIAL CELLS: 0 /HPF
GLUCOSE QUALITATIVE U: >=1000 MG/DL
KETONES URINE: NEGATIVE MG/DL
LEUKOCYTE ESTERASE URINE: NEGATIVE
MICROSCOPIC-UA: NORMAL
NITRITE URINE: NEGATIVE
PH URINE: 6
PROTEIN URINE: NEGATIVE MG/DL
RED BLOOD CELLS URINE: 0 /HPF
SPECIFIC GRAVITY URINE: >1.03
UROBILINOGEN URINE: 0.2 MG/DL
WHITE BLOOD CELLS URINE: 0 /HPF

## 2024-05-08 ENCOUNTER — NON-APPOINTMENT (OUTPATIENT)
Age: 64
End: 2024-05-08

## 2024-05-08 ENCOUNTER — APPOINTMENT (OUTPATIENT)
Dept: CARDIOLOGY | Facility: CLINIC | Age: 64
End: 2024-05-08
Payer: COMMERCIAL

## 2024-05-08 VITALS
BODY MASS INDEX: 40.5 KG/M2 | HEART RATE: 96 BPM | DIASTOLIC BLOOD PRESSURE: 78 MMHG | SYSTOLIC BLOOD PRESSURE: 135 MMHG | OXYGEN SATURATION: 97 % | HEIGHT: 66 IN | WEIGHT: 252 LBS

## 2024-05-08 DIAGNOSIS — E78.00 PURE HYPERCHOLESTEROLEMIA, UNSPECIFIED: ICD-10-CM

## 2024-05-08 DIAGNOSIS — I25.10 ATHEROSCLEROTIC HEART DISEASE OF NATIVE CORONARY ARTERY W/OUT ANGINA PECTORIS: ICD-10-CM

## 2024-05-08 DIAGNOSIS — R06.02 SHORTNESS OF BREATH: ICD-10-CM

## 2024-05-08 DIAGNOSIS — Z79.4 TYPE 2 DIABETES MELLITUS W/OUT COMPLICATIONS: ICD-10-CM

## 2024-05-08 DIAGNOSIS — E11.9 TYPE 2 DIABETES MELLITUS W/OUT COMPLICATIONS: ICD-10-CM

## 2024-05-08 DIAGNOSIS — K21.9 GASTRO-ESOPHAGEAL REFLUX DISEASE W/OUT ESOPHAGITIS: ICD-10-CM

## 2024-05-08 PROCEDURE — 99214 OFFICE O/P EST MOD 30 MIN: CPT | Mod: 25

## 2024-05-08 PROCEDURE — G2211 COMPLEX E/M VISIT ADD ON: CPT | Mod: NC,1L

## 2024-05-08 PROCEDURE — 93000 ELECTROCARDIOGRAM COMPLETE: CPT

## 2024-05-08 NOTE — REASON FOR VISIT
[FreeTextEntry1] : Had "normal" cardiac cath in [place] for exertional chest pain. Now with abnormal calcium score

## 2024-05-08 NOTE — REVIEW OF SYSTEMS
[Negative] : Heme/Lymph [Weight Gain (___ Lbs)] : no recent weight gain [Feeling Fatigued] : not feeling fatigued [Weight Loss (___ Lbs)] : no recent weight loss [SOB] : no shortness of breath [Dyspnea on exertion] : not dyspnea during exertion [Chest Discomfort] : no chest discomfort [Lower Ext Edema] : no extremity edema [Leg Claudication] : no intermittent leg claudication [Palpitations] : no palpitations [Syncope] : no syncope [Heartburn] : no heartburn [Change in Appetite] : no change in appetite [Rash] : no rash [Dizziness] : no dizziness [Depression] : no depression [Under Stress] : not under stress

## 2024-05-08 NOTE — CARDIOLOGY SUMMARY
[No Ischemia] : no Ischemia [___] : [unfilled] [LVEF ___%] : LVEF [unfilled]% [Enlarged] : enlarged LA size [None] : no mitral regurgitation [___] : [unfilled]

## 2024-05-08 NOTE — ASSESSMENT
[FreeTextEntry1] : Has multiple risk factors for coronary disease, still drinking more than just socially, still smokes cigars but does not inhale and no cigarettes. Again surprised that he had "normal coronary arteries" but his blood pressure is also excellent on his current regimen and his labs in the past have been pretty good on his current regimen. He denies exertional chest pain and any exertional shortness of breath seems to be the same as his baseline based on his weight. Exam unremarkable and EKG within normal limits. Long discussion with the patient and his wife we agreed to schedule a coronary artery calcium score. Certainly if the score came back very high he should have a stress test etc. If the score is somewhere in the middle then he does need to be more aggressive about his risk factor control and weight loss with his score is extremely low and we will probably just continue the current regimen. Labs were sent including lipids and hemoglobin A1c. Repeat PSA was sent as well and then he will follow-up with urology if abnormally high. They are asking for a new endocrinologist as well.  Calcium score came back abnormal although not exceedingly high and he is now on rosuvastatin 40 mg.  On Jardiance but not on GLP-1 agonist because he did not tolerate Ozempic with 3 days of diarrhea etc.  Has not lost weight but the weight is stable and not increasing.  Here May 8, 2024 and no change in his symptoms etc.  No change in his exam or ECG.  Labs will be sent.  He will be seeing a new endocrinologist as well.

## 2024-05-08 NOTE — HISTORY OF PRESENT ILLNESS
[FreeTextEntry1] : June 9, 2014. I first saw the patient in 2004 when he was 43 years old. At that point he was overweight with diabetes and hyperlipidemia and had stopped smoking 2 years prior he came because of an episode of passing out while on the plane after he got up to go to the bathroom. His past history had been unremarkable other than some minor surgery. He had a story compatible with acid reflux. The assumption was vasopressor syncope although he did have a sestamibi stress test on March 15 of 2004. That showed no ischemia to a heart rate of 169 with a left ventricular ejection fraction of 58%. The patient had another episode after stomach upset in July while on the airplane. He was recommended to have a tilt table test and consider beta blocker. I did not see him again until 2008. At that point he was having chest pain although he was under a lot of stress. He is being followed by Dr. Reshma Nichols for his diabetes. August of 2090 seemed to have some exertional chest pain although part of it was just getting up from a chair. This progressed until October after doing some minor building around the house he had chest tightness diffusely. EKG was unremarkable. He has next rest test was not until March of 2010 and again sestamibi scan was negative with a left ventricular ejection fraction of 63% the patient reached a heart rate of 167 achieving 14 METs. In February of 2011 he noticed chest tightness walking in the hills in [place]. Again exam and EKG were unremarkable. He did not return for a stress echo until a year later in February 2012 and again this seemed to be negative although the echo was suboptimal due to body habitus. He did reach a heart rate of 160 without EKG changes or symptoms. In August 2013 he had chest pain one night prior to leaving on a cruise. He came in for a stress echocardiogram which again showed no ST changes to a heart rate of 132 and 10 METs. Echocardiogram was only slightly suboptimal and showed no wall motion abnormalities. June 9, 2014. About one week ago walking to [employer] he noticed exertional chest tightness relieved with rest. However this has not recurred. 2 days ago he noticed significant swelling of his ankles. This happened to him once when he was in [place] and he was taken to a physician there who gave him hydrochlorothiazide and it resolved. The patient took his own hydrochlorothiazide and this seemed to improve somewhat but he is here now because he is anxious about these findings. He has been walking normally and even exercising a little bit since the chest pain episode and it has not recurred. In the office he is asymptomatic though slightly anxious. His EKG is totally within normal limits. January 22, 2018. In November the patient was in [place] and had classic exertional chest pressure walking uphill. Went to the hospital and was admitted, ruled out, coronary angiography totally normal and he was sent home. He is here now for followup. I reviewed the angiogram, and there is probably plaquing in the LAD, but no obstructive disease. He is currently seeing the endocrinology group of Dr. Ellsworth, etc. He is on Crestor, aspirin, and metformin, along with insulin. He was given a prescription in [place] for ramipril, but they did not fill it. He had been on Invokana in the past, but it made him run to the bathroom, way too much. Since being back he has had  rare exertional chest pain, usually after smoking a cigar. Otherwise, he is active and asymptomatic. He claims his labs recently, and his blood pressure at endocrine or excellent. (Normal TSH, and vitamin D 16, BUN 22, creatinine 0.5, 5, and potassium 4.7, normal LFTs, cholesterol 142, HDL 37, LDL 88, triglycerides 131. Hemoglobin A1c 7.3.) May 7, 2018. Patient is here in followup. Was in [place] in the interim. Has lost 18 pounds, but says that he had gained in between and lost even more since. No chest pain or shortness of breath. EKG within the normal limits. No change in his cardiac medicines with just a little change in his long-acting insulin. Patient's issue now seems to be hemorrhoids, and he will be seeing Dr. Olivarez and then Dr. Mcgee. March 9, 2020.  Almost 2 years since he was here.  Is now on atorvastatin 80, Ramipril 5, and aspirin 81 mg.  Otherwise is on 3 different types of insulin.  Weight has gone back up from 260-270.  Only gets exertional chest pain if he takes a little too much scotch usually on Friday night and then goes walking uphill.  This would suggest that his classic exertional chest pain with normal coronary arteries was probably exercise-induced esophageal reflux.  In any case with his risk factors he still needs close monitoring of his cardiovascular system.  Most of the time he is asymptomatic and his EKG has sinus rhythm and is within normal limits.  His blood pressure is under good control.  There may have been a mild LAD plaque at the time of his angiogram.. September 21, 2020.  Patient here in follow-up.  Denies chest pain or shortness of breath.  Blood pressure well controlled.  EKG is sinus bradycardia at 58 and within normal limits.  Patient is overweight but unchanged.  Had labs with Dr. Christianson with hemoglobin A1c 7.0 back in May, normal renal function, cholesterol 124, HDL 34, LDL 76.  February 8, 2024.  First visit in 3-1/2 years.  Since his episode that led to an angiogram and is real that came back with normal coronary arteries he has been remarkably stable despite his weight, diabetes, hyperlipidemia etc.  Still drinking alcohol on a moderate level.  Denies exertional chest pain or shortness of breath.  Reviewed all his medications.  Still on baby aspirin and unsure what the indication is for that at this point.  His blood pressure is excellent his EKG is totally within normal limits.  By the end of the visit we decided to have him schedule a coronary artery calcium score. February 15, 2024.  Coronary artery calcium score 282.  Left main 50, LAD 85, circumflex 42, .  Reviewed results with patient. No need for stress test right now but I did emphasize the need for very strict risk factor reduction including getting his diabetes under better control, keeping his LDL very low as we are doing, taking aspirin every day, and avoiding cigars even though he does not inhale.  May 8, 2024.  Patient returns in follow-up.  He did see Dr. Christianson on February 21 for primary care.  He returns today, his EKG is sinus rhythm at 83 with a left anterior hemiblock and poor R wave progression.  Unchanged.  Blood pressure was 120/64 at primary care and today 135/78.  Weight is stable but has not gone down.  In the past he did not tolerate Ozempic and is nervous about taking other GLP 1 agonists.  Unhappy with his endocrinologist and his glucose meter was not working well and he will be switching to a different endocrinologist.  No exertional chest pain or shortness of breath.  On rosuvastatin 40 mg, not on ezetimibe yet.

## 2024-05-08 NOTE — DISCUSSION/SUMMARY
[FreeTextEntry1] : Continue current meds, check labs, if LDL greater than 70 would consider adding ezetimibe.  Looking forward to new endocrinologist who perhaps would be more successful in a weight loss program and tolerating some of the newer medications.  No repeat stress test as yet. [EKG obtained to assist in diagnosis and management of assessed problem(s)] : EKG obtained to assist in diagnosis and management of assessed problem(s)

## 2024-05-11 ENCOUNTER — NON-APPOINTMENT (OUTPATIENT)
Age: 64
End: 2024-05-11

## 2024-05-20 LAB
ALBUMIN SERPL ELPH-MCNC: 4.2 G/DL
ALP BLD-CCNC: 140 U/L
ALT SERPL-CCNC: 21 U/L
ANION GAP SERPL CALC-SCNC: 16 MMOL/L
AST SERPL-CCNC: 20 U/L
BILIRUB SERPL-MCNC: 0.3 MG/DL
BUN SERPL-MCNC: 23 MG/DL
CALCIUM SERPL-MCNC: 9.5 MG/DL
CHLORIDE SERPL-SCNC: 99 MMOL/L
CHOLEST SERPL-MCNC: 131 MG/DL
CO2 SERPL-SCNC: 26 MMOL/L
CREAT SERPL-MCNC: 0.75 MG/DL
EGFR: 102 ML/MIN/1.73M2
ESTIMATED AVERAGE GLUCOSE: 189 MG/DL
GLUCOSE SERPL-MCNC: 97 MG/DL
HBA1C MFR BLD HPLC: 8.2 %
HCT VFR BLD CALC: 42 %
HDLC SERPL-MCNC: 34 MG/DL
HGB BLD-MCNC: 13.3 G/DL
LDLC SERPL CALC-MCNC: 62 MG/DL
MCHC RBC-ENTMCNC: 28.2 PG
MCHC RBC-ENTMCNC: 31.7 GM/DL
MCV RBC AUTO: 89.2 FL
NONHDLC SERPL-MCNC: 97 MG/DL
NT-PROBNP SERPL-MCNC: 67 PG/ML
PLATELET # BLD AUTO: 268 K/UL
POTASSIUM SERPL-SCNC: 4.6 MMOL/L
PROT SERPL-MCNC: 7 G/DL
RBC # BLD: 4.71 M/UL
RBC # FLD: 15.1 %
SODIUM SERPL-SCNC: 141 MMOL/L
TRIGL SERPL-MCNC: 208 MG/DL
TSH SERPL-ACNC: 1.77 UIU/ML
WBC # FLD AUTO: 9.52 K/UL

## 2024-08-03 NOTE — DISCUSSION/SUMMARY
[FreeTextEntry1] : Labs sent.  Schedule coronary artery calcium score.  Follow-up depending on those results. [EKG obtained to assist in diagnosis and management of assessed problem(s)] : EKG obtained to assist in diagnosis and management of assessed problem(s) none

## 2024-08-08 ENCOUNTER — APPOINTMENT (OUTPATIENT)
Dept: CARDIOLOGY | Facility: CLINIC | Age: 64
End: 2024-08-08

## 2024-08-26 ENCOUNTER — APPOINTMENT (OUTPATIENT)
Dept: CARDIOLOGY | Facility: CLINIC | Age: 64
End: 2024-08-26

## 2024-10-10 ENCOUNTER — APPOINTMENT (OUTPATIENT)
Dept: ORTHOPEDIC SURGERY | Facility: CLINIC | Age: 64
End: 2024-10-10
Payer: COMMERCIAL

## 2024-10-10 VITALS
OXYGEN SATURATION: 97 % | HEART RATE: 90 BPM | DIASTOLIC BLOOD PRESSURE: 69 MMHG | SYSTOLIC BLOOD PRESSURE: 118 MMHG | BODY MASS INDEX: 39.55 KG/M2 | HEIGHT: 67 IN | WEIGHT: 252 LBS

## 2024-10-10 DIAGNOSIS — M25.561 PAIN IN RIGHT KNEE: ICD-10-CM

## 2024-10-10 PROCEDURE — 99203 OFFICE O/P NEW LOW 30 MIN: CPT | Mod: 25

## 2024-10-10 PROCEDURE — 73562 X-RAY EXAM OF KNEE 3: CPT | Mod: RT

## 2024-11-18 ENCOUNTER — APPOINTMENT (OUTPATIENT)
Dept: CARDIOLOGY | Facility: CLINIC | Age: 64
End: 2024-11-18
Payer: COMMERCIAL

## 2024-11-18 ENCOUNTER — NON-APPOINTMENT (OUTPATIENT)
Age: 64
End: 2024-11-18

## 2024-11-18 VITALS
DIASTOLIC BLOOD PRESSURE: 60 MMHG | SYSTOLIC BLOOD PRESSURE: 120 MMHG | OXYGEN SATURATION: 98 % | WEIGHT: 252 LBS | BODY MASS INDEX: 39.47 KG/M2 | HEART RATE: 72 BPM

## 2024-11-18 DIAGNOSIS — E11.9 TYPE 2 DIABETES MELLITUS W/OUT COMPLICATIONS: ICD-10-CM

## 2024-11-18 DIAGNOSIS — I25.10 ATHEROSCLEROTIC HEART DISEASE OF NATIVE CORONARY ARTERY W/OUT ANGINA PECTORIS: ICD-10-CM

## 2024-11-18 DIAGNOSIS — K21.9 GASTRO-ESOPHAGEAL REFLUX DISEASE W/OUT ESOPHAGITIS: ICD-10-CM

## 2024-11-18 DIAGNOSIS — E78.00 PURE HYPERCHOLESTEROLEMIA, UNSPECIFIED: ICD-10-CM

## 2024-11-18 DIAGNOSIS — Z79.4 TYPE 2 DIABETES MELLITUS W/OUT COMPLICATIONS: ICD-10-CM

## 2024-11-18 PROCEDURE — 93000 ELECTROCARDIOGRAM COMPLETE: CPT

## 2024-11-18 PROCEDURE — 99214 OFFICE O/P EST MOD 30 MIN: CPT | Mod: 25

## 2024-11-19 LAB
ALBUMIN SERPL ELPH-MCNC: 4 G/DL
ALP BLD-CCNC: 124 U/L
ALT SERPL-CCNC: 12 U/L
ANION GAP SERPL CALC-SCNC: 13 MMOL/L
AST SERPL-CCNC: 14 U/L
BASOPHILS # BLD AUTO: 0.04 K/UL
BASOPHILS NFR BLD AUTO: 0.3 %
BILIRUB SERPL-MCNC: 0.2 MG/DL
BUN SERPL-MCNC: 21 MG/DL
CALCIUM SERPL-MCNC: 9.1 MG/DL
CHLORIDE SERPL-SCNC: 99 MMOL/L
CHOLEST SERPL-MCNC: 123 MG/DL
CO2 SERPL-SCNC: 27 MMOL/L
CREAT SERPL-MCNC: 0.87 MG/DL
EGFR: 98 ML/MIN/1.73M2
EOSINOPHIL # BLD AUTO: 0.24 K/UL
EOSINOPHIL NFR BLD AUTO: 2 %
ESTIMATED AVERAGE GLUCOSE: 183 MG/DL
GLUCOSE SERPL-MCNC: 154 MG/DL
HBA1C MFR BLD HPLC: 8 %
HCT VFR BLD CALC: 40.6 %
HDLC SERPL-MCNC: 29 MG/DL
HGB BLD-MCNC: 12.9 G/DL
IMM GRANULOCYTES NFR BLD AUTO: 1.4 %
LDLC SERPL CALC-MCNC: 63 MG/DL
LYMPHOCYTES # BLD AUTO: 2.4 K/UL
LYMPHOCYTES NFR BLD AUTO: 20.5 %
MAN DIFF?: NORMAL
MCHC RBC-ENTMCNC: 28.3 PG
MCHC RBC-ENTMCNC: 31.8 G/DL
MCV RBC AUTO: 89 FL
MONOCYTES # BLD AUTO: 0.74 K/UL
MONOCYTES NFR BLD AUTO: 6.3 %
NEUTROPHILS # BLD AUTO: 8.14 K/UL
NEUTROPHILS NFR BLD AUTO: 69.5 %
NONHDLC SERPL-MCNC: 95 MG/DL
NT-PROBNP SERPL-MCNC: <36 PG/ML
PLATELET # BLD AUTO: 257 K/UL
POTASSIUM SERPL-SCNC: 4.2 MMOL/L
PROT SERPL-MCNC: 6.9 G/DL
RBC # BLD: 4.56 M/UL
RBC # FLD: 15.2 %
SODIUM SERPL-SCNC: 139 MMOL/L
TRIGL SERPL-MCNC: 189 MG/DL
WBC # FLD AUTO: 11.72 K/UL

## 2024-11-27 ENCOUNTER — APPOINTMENT (OUTPATIENT)
Dept: OTOLARYNGOLOGY | Facility: CLINIC | Age: 64
End: 2024-11-27
Payer: COMMERCIAL

## 2024-11-27 ENCOUNTER — NON-APPOINTMENT (OUTPATIENT)
Age: 64
End: 2024-11-27

## 2024-11-27 DIAGNOSIS — H90.3 SENSORINEURAL HEARING LOSS, BILATERAL: ICD-10-CM

## 2024-11-27 PROCEDURE — 92557 COMPREHENSIVE HEARING TEST: CPT

## 2024-11-27 PROCEDURE — 99203 OFFICE O/P NEW LOW 30 MIN: CPT | Mod: 25

## 2024-11-27 PROCEDURE — 92567 TYMPANOMETRY: CPT

## 2024-12-25 ENCOUNTER — NON-APPOINTMENT (OUTPATIENT)
Age: 64
End: 2024-12-25

## 2024-12-25 DIAGNOSIS — I25.10 ATHEROSCLEROTIC HEART DISEASE OF NATIVE CORONARY ARTERY W/OUT ANGINA PECTORIS: ICD-10-CM

## 2024-12-25 DIAGNOSIS — R07.9 CHEST PAIN, UNSPECIFIED: ICD-10-CM

## 2024-12-26 ENCOUNTER — INPATIENT (INPATIENT)
Facility: HOSPITAL | Age: 64
LOS: 3 days | Discharge: HOME | DRG: 313 | End: 2024-12-30
Attending: INTERNAL MEDICINE | Admitting: STUDENT IN AN ORGANIZED HEALTH CARE EDUCATION/TRAINING PROGRAM
Payer: COMMERCIAL

## 2024-12-26 ENCOUNTER — APPOINTMENT (OUTPATIENT)
Dept: CARDIOLOGY | Facility: CLINIC | Age: 64
End: 2024-12-26
Payer: COMMERCIAL

## 2024-12-26 VITALS
WEIGHT: 251.99 LBS | RESPIRATION RATE: 20 BRPM | DIASTOLIC BLOOD PRESSURE: 75 MMHG | TEMPERATURE: 99 F | HEIGHT: 67 IN | SYSTOLIC BLOOD PRESSURE: 133 MMHG | HEART RATE: 92 BPM | OXYGEN SATURATION: 96 %

## 2024-12-26 DIAGNOSIS — R07.9 CHEST PAIN, UNSPECIFIED: ICD-10-CM

## 2024-12-26 LAB
ALBUMIN SERPL ELPH-MCNC: 3.8 G/DL — SIGNIFICANT CHANGE UP (ref 3.3–5)
ALP SERPL-CCNC: 112 U/L — SIGNIFICANT CHANGE UP (ref 40–120)
ALT FLD-CCNC: 25 U/L — SIGNIFICANT CHANGE UP (ref 10–45)
ANION GAP SERPL CALC-SCNC: 12 MMOL/L — SIGNIFICANT CHANGE UP (ref 5–17)
APTT BLD: 29.6 SEC — SIGNIFICANT CHANGE UP (ref 24.5–35.6)
AST SERPL-CCNC: 24 U/L — SIGNIFICANT CHANGE UP (ref 10–40)
BASOPHILS # BLD AUTO: 0.02 K/UL — SIGNIFICANT CHANGE UP (ref 0–0.2)
BASOPHILS NFR BLD AUTO: 0.3 % — SIGNIFICANT CHANGE UP (ref 0–2)
BILIRUB SERPL-MCNC: 0.4 MG/DL — SIGNIFICANT CHANGE UP (ref 0.2–1.2)
BUN SERPL-MCNC: 14 MG/DL — SIGNIFICANT CHANGE UP (ref 7–23)
CALCIUM SERPL-MCNC: 9 MG/DL — SIGNIFICANT CHANGE UP (ref 8.4–10.5)
CHLORIDE SERPL-SCNC: 98 MMOL/L — SIGNIFICANT CHANGE UP (ref 96–108)
CK MB CFR SERPL CALC: 2.2 NG/ML — SIGNIFICANT CHANGE UP (ref 0–6.7)
CK SERPL-CCNC: 94 U/L — SIGNIFICANT CHANGE UP (ref 30–200)
CO2 SERPL-SCNC: 24 MMOL/L — SIGNIFICANT CHANGE UP (ref 22–31)
CREAT SERPL-MCNC: 1.3 MG/DL — SIGNIFICANT CHANGE UP (ref 0.5–1.3)
EGFR: 62 ML/MIN/1.73M2 — SIGNIFICANT CHANGE UP
EOSINOPHIL # BLD AUTO: 0.11 K/UL — SIGNIFICANT CHANGE UP (ref 0–0.5)
EOSINOPHIL NFR BLD AUTO: 1.8 % — SIGNIFICANT CHANGE UP (ref 0–6)
GLUCOSE SERPL-MCNC: 308 MG/DL — HIGH (ref 70–99)
HCT VFR BLD CALC: 36.5 % — LOW (ref 39–50)
HGB BLD-MCNC: 11.9 G/DL — LOW (ref 13–17)
IMM GRANULOCYTES NFR BLD AUTO: 1.3 % — HIGH (ref 0–0.9)
INR BLD: 1.21 RATIO — HIGH (ref 0.85–1.16)
LYMPHOCYTES # BLD AUTO: 0.73 K/UL — LOW (ref 1–3.3)
LYMPHOCYTES # BLD AUTO: 11.7 % — LOW (ref 13–44)
MCHC RBC-ENTMCNC: 28.5 PG — SIGNIFICANT CHANGE UP (ref 27–34)
MCHC RBC-ENTMCNC: 32.6 G/DL — SIGNIFICANT CHANGE UP (ref 32–36)
MCV RBC AUTO: 87.3 FL — SIGNIFICANT CHANGE UP (ref 80–100)
MONOCYTES # BLD AUTO: 0.8 K/UL — SIGNIFICANT CHANGE UP (ref 0–0.9)
MONOCYTES NFR BLD AUTO: 12.8 % — SIGNIFICANT CHANGE UP (ref 2–14)
NEUTROPHILS # BLD AUTO: 4.52 K/UL — SIGNIFICANT CHANGE UP (ref 1.8–7.4)
NEUTROPHILS NFR BLD AUTO: 72.1 % — SIGNIFICANT CHANGE UP (ref 43–77)
NRBC # BLD: 0 /100 WBCS — SIGNIFICANT CHANGE UP (ref 0–0)
NT-PROBNP SERPL-SCNC: 61 PG/ML — SIGNIFICANT CHANGE UP (ref 0–300)
PLATELET # BLD AUTO: 181 K/UL — SIGNIFICANT CHANGE UP (ref 150–400)
POTASSIUM SERPL-MCNC: 4.3 MMOL/L — SIGNIFICANT CHANGE UP (ref 3.5–5.3)
POTASSIUM SERPL-SCNC: 4.3 MMOL/L — SIGNIFICANT CHANGE UP (ref 3.5–5.3)
PROT SERPL-MCNC: 6.8 G/DL — SIGNIFICANT CHANGE UP (ref 6–8.3)
PROTHROM AB SERPL-ACNC: 13.8 SEC — HIGH (ref 9.9–13.4)
RBC # BLD: 4.18 M/UL — LOW (ref 4.2–5.8)
RBC # FLD: 15.3 % — HIGH (ref 10.3–14.5)
SODIUM SERPL-SCNC: 134 MMOL/L — LOW (ref 135–145)
TROPONIN T, HIGH SENSITIVITY RESULT: 23 NG/L — SIGNIFICANT CHANGE UP (ref 0–51)
TROPONIN T, HIGH SENSITIVITY RESULT: 24 NG/L — SIGNIFICANT CHANGE UP (ref 0–51)
WBC # BLD: 6.26 K/UL — SIGNIFICANT CHANGE UP (ref 3.8–10.5)
WBC # FLD AUTO: 6.26 K/UL — SIGNIFICANT CHANGE UP (ref 3.8–10.5)

## 2024-12-26 PROCEDURE — 93010 ELECTROCARDIOGRAM REPORT: CPT

## 2024-12-26 PROCEDURE — 99285 EMERGENCY DEPT VISIT HI MDM: CPT

## 2024-12-26 PROCEDURE — 93015 CV STRESS TEST SUPVJ I&R: CPT

## 2024-12-26 PROCEDURE — 71045 X-RAY EXAM CHEST 1 VIEW: CPT | Mod: 26

## 2024-12-26 RX ORDER — ASPIRIN 81 MG
162 TABLET, DELAYED RELEASE (ENTERIC COATED) ORAL ONCE
Refills: 0 | Status: COMPLETED | OUTPATIENT
Start: 2024-12-26 | End: 2024-12-26

## 2024-12-26 RX ADMIN — Medication 162 MILLIGRAM(S): at 21:03

## 2024-12-26 NOTE — ED PROVIDER NOTE - OBJECTIVE STATEMENT
63 y prior smoker hx HTN DM p/w chest pain and dyspnea on exertion. was evaluated by Dr. Mariee and was sent in for abnormal stress test done 12/16. patient says chest pain is central and at rest but worse with exertion, no associated dizziness, n/v, numbness/tingling/weakness in extremities.

## 2024-12-26 NOTE — ED PROVIDER NOTE - PHYSICAL EXAMINATION
GENERAL: well appearing in no acute distress, non-toxic appearing  HEAD: normocephalic, atraumatic  CARDIAC: regular rate and rhythm, normal S1S2, no appreciable murmurs, 2+ pulses in UE/LE b/l  PULM: normal breath sounds, clear to ascultation bilaterally, no rales, rhonchi, wheezing  GI: abdomen nondistended, soft, nontender, no guarding, rebound tenderness  NEURO: Moves all extremities spontaneously. symmetric  strength b/l UE, elbow flexion 5/5 bilaterally, elbow extension 5/5 bilaterally, patient can straight leg raise bilaterally and resist symmetrically, symmetric smile, EOM intact b/l  MSK: b/l pitting edema

## 2024-12-26 NOTE — ED ADULT NURSE REASSESSMENT NOTE - NS ED NURSE REASSESS COMMENT FT1
Admitting ACP contacted, pt received bed assignment, awaiting transport. VSS, pt denies chest pain, as per ACP, pt ok to go to floor off tele.

## 2024-12-26 NOTE — ED CLERICAL - NS ED CLERK NOTE PRE-ARRIVAL INFORMATION; ADDITIONAL PRE-ARRIVAL INFORMATION
CC/Reason For referral: Chest pain and abnormal stress Test  Preferred Consultant(if applicable): Haja Roth from  is aware please call cards fellow  Who admits for you (if needed): Cards Fellow or Medicine  Do you have documents you would like to fax over?  Would you still like to speak to an ED attending? please call when pt is seen

## 2024-12-26 NOTE — ED PROVIDER NOTE - ATTENDING CONTRIBUTION TO CARE
I, Itz Angulo MD, Emergency Medicine Attending Physician, personally saw and examined the patient and I personally made/approve the management plan and take responsibility for the patient management.    MDM: 63-year-old male with history of hypertension and diabetes, prior smoker who presents with chest pain and dyspnea on exertion.  Patient was seen by cardiologist Dr. Mariee and had abnormal stress test today, and was sent to the ED.  Patient takes aspirin 81 mg daily.  Denies any pleuritic symptoms.  Denies any radiation of pain.    ROS: denies trauma, fevers, chills, cough, abdominal pain, nausea, vomiting, diaphoresis, dizziness, syncope, leg pain, paresthesia, numbness, or weakness.    On examination, patient with stable vitals, well-appearing, in no acute distress.  Head NCAT, eyes PERRL.  Cardiac examination RRR, lungs CTAB with no W/R/R.  Abdomen soft, non-tender and non-distended, no rebound, no guarding, no rigidity. No CVA tenderness.  There is no calf tenderness or leg swelling. Negative Pete's sign.  Neurovascularly intact in all 4 extremities with 5/5 strength, normal sensation, equal pulses and brisk capillary refill, soft compartments.  Cranial nerves III-XII intact, no pronator drift, normal speech and gait.    Will evaluate patient for ACS. EKG does not show evidence of ST depressions or elevations. Breath sounds symmetric, not likely to be pneumothorax or pneumonia. No signs or symptoms concerning for pulmonary embolism or aortic dissection. Will obtain EKG, CXR and labs including troponin. Will keep patient on cardiac monitor.    My independent interpretation of the EKG shows:  Normal sinus rhythm with rate of 87 bpm, normal axis, no ST elevations or depressions.  QTc 457.    Labs reviewed, nonactionable.  Glucose of 308, but no anion gap acidosis.  Troponin indeterminate range of 24, will obtain repeat.  Discussed with cardiology who states Dr. Mariee is a private cardiologist, and can admit patient to medicine, and have subsequent cardiology consult in the morning. I, Itz Angulo MD, Emergency Medicine Attending Physician, personally saw and examined the patient and I personally made/approve the management plan and take responsibility for the patient management.    MDM: 63-year-old male with history of hypertension and diabetes, prior smoker who presents with chest pain and dyspnea on exertion.  Patient was seen by cardiologist Dr. Mariee and had abnormal stress test today, and was sent to the ED.  Patient takes aspirin 81 mg daily.  Denies any pleuritic symptoms.  Denies any radiation of pain.    ROS: denies trauma, fevers, chills, cough, abdominal pain, nausea, vomiting, diaphoresis, dizziness, syncope, leg pain, paresthesia, numbness, or weakness.    On examination, patient with stable vitals, well-appearing, in no acute distress.  Head NCAT, eyes PERRL.  Cardiac examination RRR, lungs CTAB with no W/R/R.  Abdomen soft, non-tender and non-distended, no rebound, no guarding, no rigidity. No CVA tenderness.  There is no calf tenderness or leg swelling. Negative Pete's sign.  Neurovascularly intact in all 4 extremities with 5/5 strength, normal sensation, equal pulses and brisk capillary refill, soft compartments.  Cranial nerves III-XII intact, no pronator drift, normal speech and gait.    Will evaluate patient for ACS. EKG does not show evidence of ST depressions or elevations. Breath sounds symmetric, not likely to be pneumothorax or pneumonia. No signs or symptoms concerning for pulmonary embolism or aortic dissection. Will obtain EKG, CXR and labs including troponin. Will keep patient on cardiac monitor.    My independent interpretation of the EKG shows:  Normal sinus rhythm with rate of 87 bpm, normal axis, no ST elevations or depressions.  QTc 457.    Labs reviewed, nonactionable.  Glucose of 308, but no anion gap acidosis.  Troponin indeterminate range of 24, will obtain repeat.  Discussed with cardiology who states Dr. Mariee is a private cardiologist, and can admit patient to medicine, and have subsequent cardiology consult in the morning.    The patient will need to be admitted to the hospital for continued evaluation and management.  Discussed with the accepting physician regarding the initial presentation, diagnostic studies, treatments given in the ED, and current plan of care.  The patient was accepted by and endorsed to the medicine team with plan for cardiology consultation in the morning.

## 2024-12-26 NOTE — ED ADULT NURSE NOTE - OBJECTIVE STATEMENT
Pt is 64 y/o male, presenting to the ED c/o chest pressure. Pt went to cardiologist today where he had stress test, has per pt he has had worsening SOB and non-radiating chest pressure since. Pt instructed to come to ED for cath tomorrow AM. PMH DM, complaint w/ daily medications. Upon assessment, pt AxOx3, sitting up in stretcher and speaking in full sentences. Breathing spontaneously and unlabored, >95% RA. Abdomen soft and nontender to palpation. EKG done, given to MD, pt placed on continuous cardiac monitor. Pt moves all extremities w/ = strength, mild edema noted to b/l LE. Skin is warm, dry, and intact w/ + peripheral pulses. Pt denies  n/v/d, dizziness, vision changes, chills, and fever. Safety and comfort measures provided- bed in lowest position, locked, and blanket given. Wife @ bedside.

## 2024-12-26 NOTE — ED PROVIDER NOTE - CLINICAL SUMMARY MEDICAL DECISION MAKING FREE TEXT BOX
presents for chest pain and dyspnea on exertion abnormal stress. went to Dr. Mariee last wk EKG NSR no ischemic changes, concern for angina, normal coronary cath a few years ago in [facility], recently had calcium score 282 w 50% in left main, 85% in LAD and 42% in circumflex, 105 in RCA.  on arrival EKG NSR, no active chest pain, vitals within normal limits, exam unremarkable RRR lungs clear abdomen soft pitting edema b/l, will consult cards to see if pt cath.

## 2024-12-27 ENCOUNTER — RESULT REVIEW (OUTPATIENT)
Age: 64
End: 2024-12-27

## 2024-12-27 DIAGNOSIS — Z29.9 ENCOUNTER FOR PROPHYLACTIC MEASURES, UNSPECIFIED: ICD-10-CM

## 2024-12-27 DIAGNOSIS — R07.9 CHEST PAIN, UNSPECIFIED: ICD-10-CM

## 2024-12-27 DIAGNOSIS — Z98.890 OTHER SPECIFIED POSTPROCEDURAL STATES: Chronic | ICD-10-CM

## 2024-12-27 DIAGNOSIS — Z29.89 ENCOUNTER FOR OTHER SPECIFIED PROPHYLACTIC MEASURES: ICD-10-CM

## 2024-12-27 DIAGNOSIS — Z86.39 PERSONAL HISTORY OF OTHER ENDOCRINE, NUTRITIONAL AND METABOLIC DISEASE: ICD-10-CM

## 2024-12-27 DIAGNOSIS — I25.10 ATHEROSCLEROTIC HEART DISEASE OF NATIVE CORONARY ARTERY WITHOUT ANGINA PECTORIS: ICD-10-CM

## 2024-12-27 DIAGNOSIS — I10 ESSENTIAL (PRIMARY) HYPERTENSION: ICD-10-CM

## 2024-12-27 DIAGNOSIS — D64.9 ANEMIA, UNSPECIFIED: ICD-10-CM

## 2024-12-27 LAB
ADD ON TEST-SPECIMEN IN LAB: SIGNIFICANT CHANGE UP
ANION GAP SERPL CALC-SCNC: 12 MMOL/L — SIGNIFICANT CHANGE UP (ref 5–17)
BASOPHILS # BLD AUTO: 0.02 K/UL — SIGNIFICANT CHANGE UP (ref 0–0.2)
BASOPHILS NFR BLD AUTO: 0.3 % — SIGNIFICANT CHANGE UP (ref 0–2)
BUN SERPL-MCNC: 15 MG/DL — SIGNIFICANT CHANGE UP (ref 7–23)
CALCIUM SERPL-MCNC: 9.2 MG/DL — SIGNIFICANT CHANGE UP (ref 8.4–10.5)
CHLORIDE SERPL-SCNC: 101 MMOL/L — SIGNIFICANT CHANGE UP (ref 96–108)
CO2 SERPL-SCNC: 23 MMOL/L — SIGNIFICANT CHANGE UP (ref 22–31)
CREAT SERPL-MCNC: 0.84 MG/DL — SIGNIFICANT CHANGE UP (ref 0.5–1.3)
D DIMER BLD IA.RAPID-MCNC: 172 NG/ML DDU — SIGNIFICANT CHANGE UP
EGFR: 99 ML/MIN/1.73M2 — SIGNIFICANT CHANGE UP
EOSINOPHIL # BLD AUTO: 0.09 K/UL — SIGNIFICANT CHANGE UP (ref 0–0.5)
EOSINOPHIL NFR BLD AUTO: 1.3 % — SIGNIFICANT CHANGE UP (ref 0–6)
FERRITIN SERPL-MCNC: 146 NG/ML — SIGNIFICANT CHANGE UP (ref 30–400)
FLUAV AG NPH QL: DETECTED
FLUBV AG NPH QL: SIGNIFICANT CHANGE UP
FOLATE SERPL-MCNC: 15.4 NG/ML — SIGNIFICANT CHANGE UP
GLUCOSE BLDC GLUCOMTR-MCNC: 167 MG/DL — HIGH (ref 70–99)
GLUCOSE BLDC GLUCOMTR-MCNC: 173 MG/DL — HIGH (ref 70–99)
GLUCOSE BLDC GLUCOMTR-MCNC: 189 MG/DL — HIGH (ref 70–99)
GLUCOSE BLDC GLUCOMTR-MCNC: 197 MG/DL — HIGH (ref 70–99)
GLUCOSE BLDC GLUCOMTR-MCNC: 200 MG/DL — HIGH (ref 70–99)
GLUCOSE SERPL-MCNC: 216 MG/DL — HIGH (ref 70–99)
HCT VFR BLD CALC: 38.2 % — LOW (ref 39–50)
HGB BLD-MCNC: 12.3 G/DL — LOW (ref 13–17)
IMM GRANULOCYTES NFR BLD AUTO: 1.2 % — HIGH (ref 0–0.9)
IRON SATN MFR SERPL: 11 % — LOW (ref 16–55)
IRON SATN MFR SERPL: 31 UG/DL — LOW (ref 45–165)
LYMPHOCYTES # BLD AUTO: 0.75 K/UL — LOW (ref 1–3.3)
LYMPHOCYTES # BLD AUTO: 11.1 % — LOW (ref 13–44)
MAGNESIUM SERPL-MCNC: 2.1 MG/DL — SIGNIFICANT CHANGE UP (ref 1.6–2.6)
MCHC RBC-ENTMCNC: 27.6 PG — SIGNIFICANT CHANGE UP (ref 27–34)
MCHC RBC-ENTMCNC: 32.2 G/DL — SIGNIFICANT CHANGE UP (ref 32–36)
MCV RBC AUTO: 85.8 FL — SIGNIFICANT CHANGE UP (ref 80–100)
MONOCYTES # BLD AUTO: 0.75 K/UL — SIGNIFICANT CHANGE UP (ref 0–0.9)
MONOCYTES NFR BLD AUTO: 11.1 % — SIGNIFICANT CHANGE UP (ref 2–14)
NEUTROPHILS # BLD AUTO: 5.04 K/UL — SIGNIFICANT CHANGE UP (ref 1.8–7.4)
NEUTROPHILS NFR BLD AUTO: 75 % — SIGNIFICANT CHANGE UP (ref 43–77)
NRBC # BLD: 0 /100 WBCS — SIGNIFICANT CHANGE UP (ref 0–0)
PHOSPHATE SERPL-MCNC: 2.9 MG/DL — SIGNIFICANT CHANGE UP (ref 2.5–4.5)
PLATELET # BLD AUTO: 166 K/UL — SIGNIFICANT CHANGE UP (ref 150–400)
POTASSIUM SERPL-MCNC: 4.4 MMOL/L — SIGNIFICANT CHANGE UP (ref 3.5–5.3)
POTASSIUM SERPL-SCNC: 4.4 MMOL/L — SIGNIFICANT CHANGE UP (ref 3.5–5.3)
PROCALCITONIN SERPL-MCNC: 0.12 NG/ML — HIGH (ref 0.02–0.1)
PROCALCITONIN SERPL-MCNC: 0.12 NG/ML — HIGH (ref 0.02–0.1)
RBC # BLD: 4.45 M/UL — SIGNIFICANT CHANGE UP (ref 4.2–5.8)
RBC # BLD: 4.45 M/UL — SIGNIFICANT CHANGE UP (ref 4.2–5.8)
RBC # FLD: 15.4 % — HIGH (ref 10.3–14.5)
RETICS #: 102.4 K/UL — SIGNIFICANT CHANGE UP (ref 25–125)
RETICS/RBC NFR: 2.3 % — SIGNIFICANT CHANGE UP (ref 0.5–2.5)
RSV RNA NPH QL NAA+NON-PROBE: SIGNIFICANT CHANGE UP
SARS-COV-2 RNA SPEC QL NAA+PROBE: SIGNIFICANT CHANGE UP
SODIUM SERPL-SCNC: 136 MMOL/L — SIGNIFICANT CHANGE UP (ref 135–145)
TIBC SERPL-MCNC: 294 UG/DL — SIGNIFICANT CHANGE UP (ref 220–430)
TRANSFERRIN SERPL-MCNC: 250 MG/DL — SIGNIFICANT CHANGE UP (ref 200–360)
TROPONIN T, HIGH SENSITIVITY RESULT: 23 NG/L — SIGNIFICANT CHANGE UP (ref 0–51)
UIBC SERPL-MCNC: 263 UG/DL — SIGNIFICANT CHANGE UP (ref 110–370)
VIT B12 SERPL-MCNC: 499 PG/ML — SIGNIFICANT CHANGE UP (ref 232–1245)
WBC # BLD: 6.73 K/UL — SIGNIFICANT CHANGE UP (ref 3.8–10.5)
WBC # FLD AUTO: 6.73 K/UL — SIGNIFICANT CHANGE UP (ref 3.8–10.5)

## 2024-12-27 PROCEDURE — 99223 1ST HOSP IP/OBS HIGH 75: CPT

## 2024-12-27 PROCEDURE — 93970 EXTREMITY STUDY: CPT | Mod: 26

## 2024-12-27 PROCEDURE — 99222 1ST HOSP IP/OBS MODERATE 55: CPT

## 2024-12-27 PROCEDURE — 99221 1ST HOSP IP/OBS SF/LOW 40: CPT

## 2024-12-27 PROCEDURE — 93306 TTE W/DOPPLER COMPLETE: CPT | Mod: 26

## 2024-12-27 RX ORDER — INSULIN GLARGINE-YFGN 100 [IU]/ML
28 INJECTION, SOLUTION SUBCUTANEOUS EVERY MORNING
Refills: 0 | Status: DISCONTINUED | OUTPATIENT
Start: 2024-12-27 | End: 2024-12-28

## 2024-12-27 RX ORDER — GLUCAGON INJECTION, SOLUTION 0.5 MG/.1ML
1 INJECTION, SOLUTION SUBCUTANEOUS ONCE
Refills: 0 | Status: DISCONTINUED | OUTPATIENT
Start: 2024-12-27 | End: 2024-12-30

## 2024-12-27 RX ORDER — DEXTROSE MONOHYDRATE 25 G/50ML
25 INJECTION, SOLUTION INTRAVENOUS ONCE
Refills: 0 | Status: DISCONTINUED | OUTPATIENT
Start: 2024-12-27 | End: 2024-12-29

## 2024-12-27 RX ORDER — RAMIPRIL 5 MG/1
1 CAPSULE ORAL
Refills: 0 | DISCHARGE

## 2024-12-27 RX ORDER — INFLUENZA A VIRUS A/WISCONSIN/588/2019 (H1N1) RECOMBINANT HEMAGGLUTININ ANTIGEN, INFLUENZA A VIRUS A/DARWIN/6/2021 (H3N2) RECOMBINANT HEMAGGLUTININ ANTIGEN, INFLUENZA B VIRUS B/AUSTRIA/1359417/2021 RECOMBINANT HEMAGGLUTININ ANTIGEN, AND INFLUENZA B VIRUS B/PHUKET/3073/2013 RECOMBINANT HEMAGGLUTININ ANTIGEN 45; 45; 45; 45 UG/.5ML; UG/.5ML; UG/.5ML; UG/.5ML
0.5 INJECTION INTRAMUSCULAR ONCE
Refills: 0 | Status: DISCONTINUED | OUTPATIENT
Start: 2024-12-27 | End: 2024-12-30

## 2024-12-27 RX ORDER — METFORMIN 850 MG/1
1 TABLET ORAL
Refills: 0 | DISCHARGE

## 2024-12-27 RX ORDER — OMEPRAZOLE MAGNESIUM 20 MG/1
1 CAPSULE, DELAYED RELEASE ORAL
Refills: 0 | DISCHARGE

## 2024-12-27 RX ORDER — PANTOPRAZOLE 40 MG/1
40 TABLET, DELAYED RELEASE ORAL
Refills: 0 | Status: DISCONTINUED | OUTPATIENT
Start: 2024-12-27 | End: 2024-12-30

## 2024-12-27 RX ORDER — DEXTROSE MONOHYDRATE 25 G/50ML
12.5 INJECTION, SOLUTION INTRAVENOUS ONCE
Refills: 0 | Status: DISCONTINUED | OUTPATIENT
Start: 2024-12-27 | End: 2024-12-29

## 2024-12-27 RX ORDER — OSELTAMIVIR 75 MG/1
75 CAPSULE ORAL
Refills: 0 | Status: DISCONTINUED | OUTPATIENT
Start: 2024-12-27 | End: 2024-12-30

## 2024-12-27 RX ORDER — INSULIN LISPRO 100/ML
VIAL (ML) SUBCUTANEOUS
Refills: 0 | Status: DISCONTINUED | OUTPATIENT
Start: 2024-12-27 | End: 2024-12-29

## 2024-12-27 RX ORDER — BENZONATATE 100 MG
100 CAPSULE ORAL THREE TIMES A DAY
Refills: 0 | Status: DISCONTINUED | OUTPATIENT
Start: 2024-12-27 | End: 2024-12-30

## 2024-12-27 RX ORDER — SODIUM CHLORIDE 9 MG/ML
1000 INJECTION, SOLUTION INTRAVENOUS
Refills: 0 | Status: DISCONTINUED | OUTPATIENT
Start: 2024-12-27 | End: 2024-12-30

## 2024-12-27 RX ORDER — INSULIN DEGLUDEC INJECTION 100 U/ML
60 INJECTION, SOLUTION SUBCUTANEOUS
Refills: 0 | DISCHARGE

## 2024-12-27 RX ORDER — ACETAMINOPHEN 80 MG/.8ML
650 SOLUTION/ DROPS ORAL EVERY 6 HOURS
Refills: 0 | Status: DISCONTINUED | OUTPATIENT
Start: 2024-12-27 | End: 2024-12-30

## 2024-12-27 RX ORDER — LISINOPRIL 30 MG/1
20 TABLET ORAL DAILY
Refills: 0 | Status: DISCONTINUED | OUTPATIENT
Start: 2024-12-27 | End: 2024-12-30

## 2024-12-27 RX ORDER — DEXTROSE MONOHYDRATE 25 G/50ML
15 INJECTION, SOLUTION INTRAVENOUS ONCE
Refills: 0 | Status: DISCONTINUED | OUTPATIENT
Start: 2024-12-27 | End: 2024-12-29

## 2024-12-27 RX ORDER — ROSUVASTATIN 40 MG/1
1 TABLET, FILM COATED ORAL
Refills: 0 | DISCHARGE

## 2024-12-27 RX ORDER — ASPIRIN 81 MG
81 TABLET, DELAYED RELEASE (ENTERIC COATED) ORAL DAILY
Refills: 0 | Status: DISCONTINUED | OUTPATIENT
Start: 2024-12-27 | End: 2024-12-30

## 2024-12-27 RX ORDER — EMPAGLIFLOZIN 10 MG/1
1 TABLET, FILM COATED ORAL
Refills: 0 | DISCHARGE

## 2024-12-27 RX ORDER — INSULIN LISPRO 100/ML
VIAL (ML) SUBCUTANEOUS AT BEDTIME
Refills: 0 | Status: DISCONTINUED | OUTPATIENT
Start: 2024-12-27 | End: 2024-12-29

## 2024-12-27 RX ORDER — INSULIN ASPART 100/ML
0 VIAL (ML) SUBCUTANEOUS
Refills: 0 | DISCHARGE

## 2024-12-27 RX ORDER — HEPARIN SODIUM 1000 [USP'U]/ML
5000 INJECTION, SOLUTION INTRAVENOUS; SUBCUTANEOUS EVERY 8 HOURS
Refills: 0 | Status: DISCONTINUED | OUTPATIENT
Start: 2024-12-27 | End: 2024-12-30

## 2024-12-27 RX ORDER — INSULIN LISPRO 100/ML
9 VIAL (ML) SUBCUTANEOUS
Refills: 0 | Status: DISCONTINUED | OUTPATIENT
Start: 2024-12-27 | End: 2024-12-28

## 2024-12-27 RX ORDER — METOPROLOL TARTRATE 50 MG
25 TABLET ORAL DAILY
Refills: 0 | Status: DISCONTINUED | OUTPATIENT
Start: 2024-12-27 | End: 2024-12-30

## 2024-12-27 RX ORDER — ROSUVASTATIN 40 MG/1
40 TABLET, FILM COATED ORAL AT BEDTIME
Refills: 0 | Status: DISCONTINUED | OUTPATIENT
Start: 2024-12-27 | End: 2024-12-30

## 2024-12-27 RX ORDER — TAMSULOSIN HYDROCHLORIDE 0.4 MG/1
1 CAPSULE ORAL
Refills: 0 | DISCHARGE

## 2024-12-27 RX ORDER — TAMSULOSIN HYDROCHLORIDE 0.4 MG/1
0.4 CAPSULE ORAL AT BEDTIME
Refills: 0 | Status: DISCONTINUED | OUTPATIENT
Start: 2024-12-27 | End: 2024-12-30

## 2024-12-27 RX ADMIN — INSULIN GLARGINE-YFGN 28 UNIT(S): 100 INJECTION, SOLUTION SUBCUTANEOUS at 12:46

## 2024-12-27 RX ADMIN — ROSUVASTATIN 40 MILLIGRAM(S): 40 TABLET, FILM COATED ORAL at 22:24

## 2024-12-27 RX ADMIN — HEPARIN SODIUM 5000 UNIT(S): 1000 INJECTION, SOLUTION INTRAVENOUS; SUBCUTANEOUS at 22:24

## 2024-12-27 RX ADMIN — Medication 81 MILLIGRAM(S): at 11:17

## 2024-12-27 RX ADMIN — Medication 1: at 12:45

## 2024-12-27 RX ADMIN — HEPARIN SODIUM 5000 UNIT(S): 1000 INJECTION, SOLUTION INTRAVENOUS; SUBCUTANEOUS at 05:46

## 2024-12-27 RX ADMIN — PANTOPRAZOLE 40 MILLIGRAM(S): 40 TABLET, DELAYED RELEASE ORAL at 05:45

## 2024-12-27 RX ADMIN — Medication 100 MILLIGRAM(S): at 22:24

## 2024-12-27 RX ADMIN — OSELTAMIVIR 75 MILLIGRAM(S): 75 CAPSULE ORAL at 19:19

## 2024-12-27 RX ADMIN — Medication 9 UNIT(S): at 19:20

## 2024-12-27 RX ADMIN — Medication 100 MILLIGRAM(S): at 11:18

## 2024-12-27 RX ADMIN — ACETAMINOPHEN 650 MILLIGRAM(S): 80 SOLUTION/ DROPS ORAL at 22:43

## 2024-12-27 RX ADMIN — Medication 1: at 19:20

## 2024-12-27 RX ADMIN — LISINOPRIL 20 MILLIGRAM(S): 30 TABLET ORAL at 05:44

## 2024-12-27 RX ADMIN — HEPARIN SODIUM 5000 UNIT(S): 1000 INJECTION, SOLUTION INTRAVENOUS; SUBCUTANEOUS at 15:22

## 2024-12-27 RX ADMIN — Medication 100 MILLIGRAM(S): at 05:44

## 2024-12-27 RX ADMIN — Medication 9 UNIT(S): at 12:44

## 2024-12-27 RX ADMIN — ACETAMINOPHEN 650 MILLIGRAM(S): 80 SOLUTION/ DROPS ORAL at 05:44

## 2024-12-27 RX ADMIN — TAMSULOSIN HYDROCHLORIDE 0.4 MILLIGRAM(S): 0.4 CAPSULE ORAL at 22:24

## 2024-12-27 NOTE — H&P ADULT - NSHPPHYSICALEXAM_GEN_ALL_CORE
PHYSICAL EXAM:  GENERAL: Obese-appearing in NAD, well-groomed, well-developed, accompanied by spouse  HEAD: Atraumatic, Normocephalic  EYES: PERRL, conjunctiva and sclera clear  ENMT: No tonsillar erythema, exudates, or enlargement; Moist mucous membranes  NECK: Supple, difficult to appreciate JVP given habitus   NERVOUS SYSTEM: Alert & Oriented X4, Good concentration; Motor Strength 5/5 B/L upper and lower extremities. Sensation equal/intact b/l UE and LE  CHEST/LUNG: Clear to auscultation bilaterally; No rales, rhonchi, wheezing, or rubs  HEART: Regular rate and rhythm; No murmurs, rubs, or gallops  ABDOMEN: Soft, Nontender, Nondistended; Bowel sounds present. No guarding, rebound tenderness, or rigidity.  EXTREMITIES: 2+ Peripheral Pulses, No edema/warmth/erythema/tenderness to palpation b/l LE

## 2024-12-27 NOTE — H&P ADULT - PROBLEM SELECTOR PLAN 6
- HSQ VTE ppx  - CC/DASH diet pending RN dysphagia screen  - fall precautions   - disposition pending course   - repeat CBC diff in AM given lymphopenia and PT/INR given prolonged

## 2024-12-27 NOTE — H&P ADULT - PROBLEM SELECTOR PLAN 2
cough w/ yellow-colored sputum x weeks not improved w/ PO abx outpatient (cefdinir, azithromycin), reporting chills, w/o fever or leukocytosis here, w/o respiratory distress   - will obtain procal, CT chest NC to further evaluate (CXR w/o consolidation)  - if findings c/f PNA, consider initiation of cefepime  - benzonotate prn cough

## 2024-12-27 NOTE — PATIENT PROFILE ADULT - NSPROPTRIGHTNOTIFYOBTAINDET_GEN_A_NUR
Patient states he does not drink or take non prescription drugs. He does admit to nitrous oxide inhalation in the very recent past. Counseled patient regarding adverse effects on his health. Encouraged him to seek counseling. Provided Addiction Resource material -- Fall with Harm Risk

## 2024-12-27 NOTE — H&P ADULT - HISTORY OF PRESENT ILLNESS
63M former smoker, PMH T2DM, HTN, p/w CP, MANCILLA referred by cardiology Dr. Mariee i/s/o recent abn stress outpatient.     lymphopenia, H/H 11.9/36.5 (normocytic); PT/INR prolonged; HST 24 -> 23, proBNP 61     CXR (prelim): Clear lungs.    s/p ASA 162mg PO    House cardiology c/s by ED, impression: no current e/o ACS, recommend c/s Dr. Mariee's group in AM  63M former smoker, PMH T2DM, HTN, HLD, GERD p/w CP, MANCILLA, cough. AOx4, accompanied by wife assisting in collateral, reports exertional CP when ascending incline surfaces x years, however x 2 weeks has experienced chest tightness/pressure at rest w/o radiation, in addition to dyspnea on minimal exertion. This occurs in context of cough with yellow-colored sputum production of similar duration accompanied by chills, for which he reportedly was evaluated by urgent care and prescribed azithromycin, followed by cefdinir, w/o sx abatement. Visited cardiology Dr. Frederic mariee for sx, underwent exercise stress test (also noting elevated calcium score previously with abnormal result prompting referral to ED for evaluation. Reports experiencing intermittent sx here in hospital, currently resolved. Not endorsing fever, lightheadedness, dizziness, numbness, weakness, tingling, diaphoresis, palpitations, abd pain, n/v/d, melena, hematochezia, dysuria, hematuria, LE swelling, or other complaints. Denies recent sick contact or travel.     lymphopenia, H/H 11.9/36.5 (normocytic); PT/INR prolonged; HST 24 -> 23, proBNP 61     CXR (prelim): Clear lungs.    s/p ASA 162mg PO    House cardiology c/s by ED, impression: no current e/o ACS, recommend c/s Dr. Mariee's group in AM  63M former 30 pack-year smoker, quit 2001, PMH T2DM, HTN, HLD, GERD p/w CP, MANCILLA, cough. AOx4, accompanied by wife assisting in collateral, reports exertional CP when ascending incline surfaces x years, however x 2 weeks has experienced chest tightness/pressure at rest w/o radiation, in addition to dyspnea on minimal exertion. This occurs in context of cough with yellow-colored sputum production of similar duration accompanied by chills, for which he reportedly was evaluated by urgent care and prescribed azithromycin, followed by cefdinir, w/o sx abatement. Visited cardiology Dr. Frederic mariee for sx, underwent exercise stress test (also noting elevated calcium score previously with abnormal result prompting referral to ED for evaluation. Reports experiencing intermittent sx here in hospital, currently resolved. Not endorsing fever, lightheadedness, dizziness, numbness, weakness, tingling, diaphoresis, palpitations, abd pain, n/v/d, melena, hematochezia, dysuria, hematuria, LE swelling, or other complaints. Denies recent sick contact or travel.     lymphopenia, H/H 11.9/36.5 (normocytic); PT/INR prolonged; HST 24 -> 23, proBNP 61     CXR (prelim): Clear lungs.    s/p ASA 162mg PO    House cardiology c/s by ED, impression: no current e/o ACS, recommend c/s Dr. Mariee's group in AM

## 2024-12-27 NOTE — PATIENT PROFILE ADULT - FUNCTIONAL SCREEN CURRENT LEVEL: SWALLOWING (IF SCORE 2 OR MORE FOR ANY ITEM, CONSULT REHAB SERVICES), MLM)
CD given to pt. Verified name/ test on CD with pt. Pt taken to car by wheelchair and taken home by family. NAD noted at time of discharge. 0 = swallows foods/liquids without difficulty

## 2024-12-27 NOTE — H&P ADULT - ASSESSMENT
63M former 30 pack-year smoker, quit 2001, PMH T2DM, HTN, HLD, GERD p/w CP, MANCILLA, cough, referred by cardiology Dr. aMriee given abn outpatient stress test

## 2024-12-27 NOTE — H&P ADULT - PROBLEM SELECTOR PLAN 1
CXR w/o acute cardipulm path, EKG and HST trend not c/f ACS (house cardiology evaluated as well w/o c/f ACS)   - obtain repeat EKG, Mary given recurrent sx while here to ensure stability  - obtain TTE  - monitor telemetry  - c/w home ASA, statin, ACE-i  - c/s cardiology Dr. Mariee's group in AM for ischemic evaluation recommendations CXR w/o acute cardipulm path, EKG and HST trend not c/f ACS (house cardiology evaluated as well w/o c/f ACS)  pulses equal b/l    - obtain repeat EKG, Mary given recurrent sx while here to ensure stability, add on DDimer  - obtain TTE  - monitor telemetry  - c/w home ASA, statin, ACE-i  - c/s cardiology Dr. Mariee's group in AM for ischemic evaluation recommendations

## 2024-12-27 NOTE — H&P ADULT - NSICDXPASTMEDICALHX_GEN_ALL_CORE_FT
PAST MEDICAL HISTORY:  Chronic hypertension     DM (diabetes mellitus)      PAST MEDICAL HISTORY:  DM (diabetes mellitus)     GERD (gastroesophageal reflux disease)     HLD (hyperlipidemia)

## 2024-12-27 NOTE — H&P ADULT - PROBLEM SELECTOR PLAN 3
home tresiba 60u BID  did not receive insulin in ED  - will dose weight based 28u lantus (with dose stat) and 9u admelog pre-meal TID, SHILO and monitor FS. If not controlled would consider endocrine assistance in AM

## 2024-12-27 NOTE — PATIENT PROFILE ADULT - FALL HARM RISK - UNIVERSAL INTERVENTIONS
Bed in lowest position, wheels locked, appropriate side rails in place/Call bell, personal items and telephone in reach/Instruct patient to call for assistance before getting out of bed or chair/Non-slip footwear when patient is out of bed/[place] to call system/Physically safe environment - no spills, clutter or unnecessary equipment/Purposeful Proactive Rounding/Room/bathroom lighting operational, light cord in reach

## 2024-12-27 NOTE — CONSULT NOTE ADULT - SUBJECTIVE AND OBJECTIVE BOX
Cardiology Consult Note  ------------------------------------------------------------------------------------------  SUBJECTIVE:   63M former 30 pack-year smoker, quit 2001, PMH T2DM, HTN, HLD, GERD p/w CP, MANCILLA, cough. AOx4, accompanied by wife assisting in collateral, reports exertional CP when ascending incline surfaces x years, however x 2 weeks has experienced chest tightness/pressure at rest w/o radiation, in addition to dyspnea on minimal exertion. This occurs in context of cough with yellow-colored sputum production of similar duration accompanied by chills, for which he reportedly was evaluated by urgent care and prescribed azithromycin, followed by cefdinir, w/o sx abatement. Visited cardiology Dr. Frederic man for sx, underwent exercise stress test (also noting elevated calcium score previously with abnormal result) prompting referral to ED for evaluation. Reports experiencing intermittent sx here in hospital, currently resolved. Not endorsing fever, lightheadedness, dizziness, numbness, weakness, tingling, diaphoresis, palpitations, abd pain, n/v/d, melena, hematochezia, dysuria, hematuria, LE swelling, or other complaints. Denies recent sick contact or travel.     lymphopenia, H/H 11.9/36.5 (normocytic); PT/INR prolonged; HST 24 -> 23, proBNP 61     CXR (prelim): Clear lungs.    s/p ASA 162mg PO    Found to be Flu A+ on arrival to the floors      -------------------------------------------------------------------------------------------  VS:  T(F): 98.4 (12-27), Max: 101.2 (12-27)  HR: 104 (12-27) (87 - 104)  BP: 123/66 (12-27) (123/66 - 133/75)  RR: 18 (12-27)  SpO2: 95% (12-27)  I&O's Summary    PHYSICAL EXAM:  GENERAL: NAD  HEAD: Atraumatic, Normocephalic.  ENT: Moist mucous membranes.  NECK: Supple, No JVD.  CHEST/LUNG: Clear to auscultation bilaterally; No rales, rhonchi, wheezing, or rubs. Unlabored respirations.  HEART: Regular rate and rhythm; No murmurs, rubs, or gallops.  ABDOMEN: Bowel sounds present; Soft, Nontender, Nondistended.   EXTREMITIES: No edema. 2+ Peripheral Pulses, brisk capillary refill. No clubbing or cyanosis.     -------------------------------------------------------------------------------------------  LABS:                          12.3   6.73  )-----------( 166      ( 27 Dec 2024 06:16 )             38.2     12-27    136  |  101  |  15  ----------------------------<  216[H]  4.4   |  23  |  0.84    Ca    9.2      27 Dec 2024 06:15  Phos  2.9     12-27  Mg     2.1     12-27    TPro  6.8  /  Alb  3.8  /  TBili  0.4  /  DBili  x   /  AST  24  /  ALT  25  /  AlkPhos  112  12-26    PT/INR - ( 26 Dec 2024 21:16 )   PT: 13.8 sec;   INR: 1.21 ratio         PTT - ( 26 Dec 2024 21:16 )  PTT:29.6 sec  CARDIAC MARKERS ( 27 Dec 2024 06:15 )  23 ng/L / x     / x     / x     / x     / x      CARDIAC MARKERS ( 26 Dec 2024 22:24 )  23 ng/L / x     / x     / x     / x     / x      CARDIAC MARKERS ( 26 Dec 2024 21:16 )  24 ng/L / x     / x     / x     / x     / 2.2 ng/mL            -------------------------------------------------------------------------------------------  Meds:  acetaminophen     Tablet .. 650 milliGRAM(s) Oral every 6 hours PRN  aspirin  chewable 81 milliGRAM(s) Oral daily  benzonatate 100 milliGRAM(s) Oral three times a day PRN  dextrose 5%. 1000 milliLiter(s) IV Continuous <Continuous>  dextrose 5%. 1000 milliLiter(s) IV Continuous <Continuous>  dextrose 50% Injectable 25 Gram(s) IV Push once  dextrose 50% Injectable 12.5 Gram(s) IV Push once  dextrose 50% Injectable 25 Gram(s) IV Push once  dextrose Oral Gel 15 Gram(s) Oral once PRN  glucagon  Injectable 1 milliGRAM(s) IntraMuscular once  heparin   Injectable 5000 Unit(s) SubCutaneous every 8 hours  influenza   Vaccine 0.5 milliLiter(s) IntraMuscular once  insulin glargine Injectable (LANTUS) 28 Unit(s) SubCutaneous every morning  insulin lispro (ADMELOG) corrective regimen sliding scale   SubCutaneous three times a day before meals  insulin lispro (ADMELOG) corrective regimen sliding scale   SubCutaneous at bedtime  insulin lispro Injectable (ADMELOG) 9 Unit(s) SubCutaneous three times a day before meals  lisinopril 20 milliGRAM(s) Oral daily  pantoprazole    Tablet 40 milliGRAM(s) Oral before breakfast  rosuvastatin 40 milliGRAM(s) Oral at bedtime  tamsulosin 0.4 milliGRAM(s) Oral at bedtime    -------------------------------------------------------------------------------------------  Cardiovascular Diagnostic Testing:    ECG: NSR    -------------------------------------------------------------------------------------------

## 2024-12-27 NOTE — CONSULT NOTE ADULT - ASSESSMENT
63M former 30 pack-year smoker, quit 2001, PMH T2DM, HTN, HLD, GERD p/w CP, MANCILLA, cough, referred by his cardiologist Dr. Mariee given abn outpatient stress test. Has a history of elevated calcium score, reportedly with normal cath in 2023. Upon arrival to [facility] was found to be Flu A+.    #Chest pain, abnormal stress test  - D/w interventionalist Dr. Herrera. Will defer LHC today given flu+ test possible confounding recent stress test  - Please obtain coronary CTA  - If CCTA is negative, patient can be discharged with further outpatient follow up w/ his cardiologist  - If CCTA is positive, will tentatively plan for add on LHC on Sunday 12/29  - Please start metoprolol succinate 25 mg QD  - C/w ASA 81 mg QD  - Flu management per primary team    ***recommendations are not final until attending attestation*** 63M former 30 pack-year smoker, quit 2001, PMH T2DM, HTN, HLD, GERD p/w CP, MANCILLA, cough, referred by his cardiologist Dr. Mariee given abn outpatient stress test. Has a history of elevated calcium score, reportedly with normal cath in 2023. Upon arrival to [facility] was found to be Flu A+.    #Chest pain, abnormal stress test  - D/w interventionalist Dr. Herrera. Will defer LHC today given flu+ test possible confounding recent stress test  - Please obtain coronary CTA  - If CCTA is negative, patient can be discharged with further outpatient follow up w/ his cardiologist  - If CCTA is positive, will tentatively plan for add on LHC on Sunday 12/29  - Please start metoprolol succinate 25 mg QD  - C/w ASA 81 mg QD  - Flu management per primary team    ***recommendations are not final until attending attestation***    This patient was seen and examined personally by me and the plan was discussed with the fellow and/or resident above. Amendments were made as necessary to the above. Agree with the excellent note and plan above. 63M former smoker, DM2, HTN, HL here for abnormal stress test. Now Flu +. CCTA pending, asa metoprolol.     30 minutes were spent on this encounter for extensive review of medical record details including labs and/or imaging studies and/or adjacent care team and consultant records, as well as review and reconciliation of current medications. Time was spent on obtaining a history, performing physical examination of patient, and answering patient and/or family questions regarding plan of care. Time was also spent discussing plan of care with patient’s other care team members including primary and consulting teams. Time also was spent on documentation of this encounter into the EHR.    Brandin Montiel MD, MPhil, [facility]  Cardiologist, [facility]  ; [facility] and [facility]  Email: [email or web address] on-service contact/call information, go to [email or web address] and use "[employer]" to login.  Outpatient Cardiology appointments, call [phone] to arrange with a colleague; I do not have outpatient Cardiology clinic.

## 2024-12-27 NOTE — CONSULT NOTE ADULT - SUBJECTIVE AND OBJECTIVE BOX
Patient is a 63y old  Male who presents with a chief complaint of CP (27 Dec 2024 11:34)      HPI:  63M former 30 pack-year smoker, quit 2001, PMH T2DM, HTN, HLD, GERD p/w CP, MANCILLA, cough. AOx4, accompanied by wife assisting in collateral, reports exertional CP when ascending incline surfaces x years, however x 2 weeks has experienced chest tightness/pressure at rest w/o radiation, in addition to dyspnea on minimal exertion. This occurs in context of cough with yellow-colored sputum production of similar duration accompanied by chills, for which he reportedly was evaluated by urgent care and prescribed azithromycin, followed by cefdinir, w/o sx abatement. Visited cardiology Dr. Frederic mariee for sx, underwent exercise stress test (also noting elevated calcium score previously with abnormal result prompting referral to ED for evaluation. Reports experiencing intermittent sx here in hospital, currently resolved. Not endorsing fever, lightheadedness, dizziness, numbness, weakness, tingling, diaphoresis, palpitations, abd pain, n/v/d, melena, hematochezia, dysuria, hematuria, LE swelling, or other complaints. Denies recent sick contact or travel.     lymphopenia, H/H 11.9/36.5 (normocytic); PT/INR prolonged; HST 24 -> 23, proBNP 61     CXR (prelim): Clear lungs.    s/p ASA 162mg PO    House cardiology c/s by ED, impression: no current e/o ACS, recommend c/s Dr. Mariee's group in AM  (27 Dec 2024 01:13)      Above reviewed.  Tmax 101.2, WBC WNL w/ lymphopenia. SCr 1.30. Procal 0.12. Influenza A positive on COVID/RSV/Flu swab.        prior hospital charts reviewed [  ] - no recent hospitalization  primary team notes reviewed [X]  other consultant notes reviewed [X]    PAST MEDICAL & SURGICAL HISTORY:  DM (diabetes mellitus)      HLD (hyperlipidemia)      GERD (gastroesophageal reflux disease)      H/O elbow surgery      H/O shoulder surgery          Allergies  penicillin (Rash)        ANTIMICROBIALS:      OTHER MEDS: MEDICATIONS  (STANDING):  acetaminophen     Tablet .. 650 every 6 hours PRN  aspirin  chewable 81 daily  benzonatate 100 three times a day PRN  dextrose 50% Injectable 25 once  dextrose 50% Injectable 12.5 once  dextrose 50% Injectable 25 once  dextrose Oral Gel 15 once PRN  glucagon  Injectable 1 once  heparin   Injectable 5000 every 8 hours  influenza   Vaccine 0.5 once  insulin glargine Injectable (LANTUS) 28 every morning  insulin lispro (ADMELOG) corrective regimen sliding scale  three times a day before meals  insulin lispro (ADMELOG) corrective regimen sliding scale  at bedtime  insulin lispro Injectable (ADMELOG) 9 three times a day before meals  lisinopril 20 daily  metoprolol succinate ER 25 daily  pantoprazole    Tablet 40 before breakfast  rosuvastatin 40 at bedtime  tamsulosin 0.4 at bedtime      SOCIAL HISTORY:   hx smoking  non-smoker    FAMILY HISTORY:  FH: CAD (coronary artery disease)        REVIEW OF SYSTEMS  [  ] ROS unobtainable because:    [  ] All other systems negative except as noted below:	    Constitutional:  [ ] fever [ ] chills  [ ] weight loss  [ ] weakness  Skin:  [ ] rash [ ] phlebitis	  Eyes: [ ] icterus [ ] pain  [ ] discharge	  ENMT: [ ] sore throat  [ ] thrush [ ] ulcers [ ] exudates  Respiratory: [ ] dyspnea [ ] hemoptysis [ ] cough [ ] sputum	  Cardiovascular:  [ ] chest pain [ ] palpitations [ ] edema	  Gastrointestinal:  [ ] nausea [ ] vomiting [ ] diarrhea [ ] constipation [ ] pain	  Genitourinary:  [ ] dysuria [ ] frequency [ ] hematuria [ ] discharge [ ] flank pain  [ ] incontinence  Musculoskeletal:  [ ] myalgias [ ] arthralgias [ ] arthritis  [ ] back pain  Neurological:  [ ] headache [ ] seizures  [ ] confusion/altered mental status  Psychiatric:  [ ] anxiety [ ] depression	  Hematology/Lymphatics:  [ ] lymphadenopathy  Endocrine:  [ ] adrenal [ ] thyroid  Allergic/Immunologic:	 [ ] transplant [ ] seasonal    Vital Signs Last 24 Hrs  T(F): 99 (12-27-24 @ 11:35), Max: 101.2 (12-27-24 @ 04:25)    Vital Signs Last 24 Hrs  HR: 93 (12-27-24 @ 11:35) (87 - 104)  BP: 148/70 (12-27-24 @ 11:35) (123/66 - 148/70)  RR: 18 (12-27-24 @ 11:35)  SpO2: 98% (12-27-24 @ 11:35) (95% - 98%)  Wt(kg): --    PHYSICAL EXAM:  Constitutional: non-toxic, no distress  HEAD/EYES: anicteric, no conjunctival injection  ENT:  supple, no thrush  Cardiovascular:   normal S1, S2, no murmur, no edema  Respiratory:  clear BS bilaterally, no wheezes, no rales  GI:  soft, non-tender, normal bowel sounds  :  no mueller, no CVA tenderness  Musculoskeletal:  no synovitis, normal ROM  Neurologic: awake and alert, normal strength, no focal findings  Skin:  no rash, no erythema, no phlebitis  Heme/Onc: no lymphadenopathy   Psychiatric:  awake, alert, appropriate mood                                12.3   6.73  )-----------( 166      ( 27 Dec 2024 06:16 )             38.2       12-27    136  |  101  |  15  ----------------------------<  216[H]  4.4   |  23  |  0.84    Ca    9.2      27 Dec 2024 06:15  Phos  2.9     12-27  Mg     2.1     12-27    TPro  6.8  /  Alb  3.8  /  TBili  0.4  /  DBili  x   /  AST  24  /  ALT  25  /  AlkPhos  112  12-26      Urinalysis Basic - ( 27 Dec 2024 06:15 )    Color: x / Appearance: x / SG: x / pH: x  Gluc: 216 mg/dL / Ketone: x  / Bili: x / Urobili: x   Blood: x / Protein: x / Nitrite: x   Leuk Esterase: x / RBC: x / WBC x   Sq Epi: x / Non Sq Epi: x / Bacteria: x        MICROBIOLOGY:          FluA/FluB/RSV/COVID PCR (12.27.24 @ 07:17)   SARS-CoV-2 Result: [facility]: This Respiratory Panel uses polymerase chain reaction (PCR) to detect for   influenza A; influenza B; respiratory syncytial virus; and SARS-CoV-2.   Test results should be correlated with clinical presentation, patient   history, and epidemiology.  Influenza A Result: Detected  Influenza B Result: NotDete  Resp Syn Virus Result: NotDete            RADIOLOGY:  imaging below personally reviewed  < from: [facility] Duplex Lower Ext Vein Scan, Bilat (12.27.24 @ 10:52) >    IMPRESSION:  No evidence of deep venous thrombosis in either lower extremity.    < end of copied text >      < from: Xray Chest 1 View- PORTABLE-Urgent (12.26.24 @ 23:19) >  IMPRESSION:  Clear lungs.    < end of copied text >   Patient is a 63y old  Male who presents with a chief complaint of CP (27 Dec 2024 11:34)      HPI:  63M former 30 pack-year smoker, quit 2001, PMH T2DM, HTN, HLD, GERD p/w CP, MANCILLA, cough. AOx4, accompanied by wife assisting in collateral, reports exertional CP when ascending incline surfaces x years, however x 2 weeks has experienced chest tightness/pressure at rest w/o radiation, in addition to dyspnea on minimal exertion. This occurs in context of cough with yellow-colored sputum production of similar duration accompanied by chills, for which he reportedly was evaluated by urgent care and prescribed azithromycin, followed by cefdinir, w/o sx abatement. Visited cardiology Dr. Frederic mariee for sx, underwent exercise stress test (also noting elevated calcium score previously with abnormal result prompting referral to ED for evaluation. Reports experiencing intermittent sx here in hospital, currently resolved. Not endorsing fever, lightheadedness, dizziness, numbness, weakness, tingling, diaphoresis, palpitations, abd pain, n/v/d, melena, hematochezia, dysuria, hematuria, LE swelling, or other complaints. Denies recent sick contact or travel.     lymphopenia, H/H 11.9/36.5 (normocytic); PT/INR prolonged; HST 24 -> 23, proBNP 61     CXR (prelim): Clear lungs.    s/p ASA 162mg PO    House cardiology c/s by ED, impression: no current e/o ACS, recommend c/s Dr. Mariee's group in AM  (27 Dec 2024 01:13)      Above reviewed.  Tmax 101.2, WBC WNL w/ lymphopenia. SCr 1.30. Procal 0.12. Influenza A positive on COVID/RSV/Flu swab. CXR clear. BLE Duplex neg for DVT.        prior hospital charts reviewed [  ] - no recent hospitalization  primary team notes reviewed [X]  other consultant notes reviewed [X]    PAST MEDICAL & SURGICAL HISTORY:  DM (diabetes mellitus)      HLD (hyperlipidemia)      GERD (gastroesophageal reflux disease)      H/O elbow surgery      H/O shoulder surgery          Allergies  penicillin (Rash)        ANTIMICROBIALS:      OTHER MEDS: MEDICATIONS  (STANDING):  acetaminophen     Tablet .. 650 every 6 hours PRN  aspirin  chewable 81 daily  benzonatate 100 three times a day PRN  dextrose 50% Injectable 25 once  dextrose 50% Injectable 12.5 once  dextrose 50% Injectable 25 once  dextrose Oral Gel 15 once PRN  glucagon  Injectable 1 once  heparin   Injectable 5000 every 8 hours  influenza   Vaccine 0.5 once  insulin glargine Injectable (LANTUS) 28 every morning  insulin lispro (ADMELOG) corrective regimen sliding scale  three times a day before meals  insulin lispro (ADMELOG) corrective regimen sliding scale  at bedtime  insulin lispro Injectable (ADMELOG) 9 three times a day before meals  lisinopril 20 daily  metoprolol succinate ER 25 daily  pantoprazole    Tablet 40 before breakfast  rosuvastatin 40 at bedtime  tamsulosin 0.4 at bedtime      SOCIAL HISTORY:   hx smoking  non-smoker    FAMILY HISTORY:  FH: CAD (coronary artery disease)        REVIEW OF SYSTEMS  [  ] ROS unobtainable because:    [  ] All other systems negative except as noted below:	    Constitutional:  [ ] fever [ ] chills  [ ] weight loss  [ ] weakness  Skin:  [ ] rash [ ] phlebitis	  Eyes: [ ] icterus [ ] pain  [ ] discharge	  ENMT: [ ] sore throat  [ ] thrush [ ] ulcers [ ] exudates  Respiratory: [ ] dyspnea [ ] hemoptysis [ ] cough [ ] sputum	  Cardiovascular:  [ ] chest pain [ ] palpitations [ ] edema	  Gastrointestinal:  [ ] nausea [ ] vomiting [ ] diarrhea [ ] constipation [ ] pain	  Genitourinary:  [ ] dysuria [ ] frequency [ ] hematuria [ ] discharge [ ] flank pain  [ ] incontinence  Musculoskeletal:  [ ] myalgias [ ] arthralgias [ ] arthritis  [ ] back pain  Neurological:  [ ] headache [ ] seizures  [ ] confusion/altered mental status  Psychiatric:  [ ] anxiety [ ] depression	  Hematology/Lymphatics:  [ ] lymphadenopathy  Endocrine:  [ ] adrenal [ ] thyroid  Allergic/Immunologic:	 [ ] transplant [ ] seasonal    Vital Signs Last 24 Hrs  T(F): 99 (12-27-24 @ 11:35), Max: 101.2 (12-27-24 @ 04:25)    Vital Signs Last 24 Hrs  HR: 93 (12-27-24 @ 11:35) (87 - 104)  BP: 148/70 (12-27-24 @ 11:35) (123/66 - 148/70)  RR: 18 (12-27-24 @ 11:35)  SpO2: 98% (12-27-24 @ 11:35) (95% - 98%)  Wt(kg): --    PHYSICAL EXAM:  Constitutional: non-toxic, no distress  HEAD/EYES: anicteric, no conjunctival injection  ENT:  supple, no thrush  Cardiovascular:   normal S1, S2, no murmur, no edema  Respiratory:  clear BS bilaterally, no wheezes, no rales  GI:  soft, non-tender, normal bowel sounds  :  no mueller, no CVA tenderness  Musculoskeletal:  no synovitis, normal ROM  Neurologic: awake and alert, normal strength, no focal findings  Skin:  no rash, no erythema, no phlebitis  Heme/Onc: no lymphadenopathy   Psychiatric:  awake, alert, appropriate mood                                12.3   6.73  )-----------( 166      ( 27 Dec 2024 06:16 )             38.2       12-27    136  |  101  |  15  ----------------------------<  216[H]  4.4   |  23  |  0.84    Ca    9.2      27 Dec 2024 06:15  Phos  2.9     12-27  Mg     2.1     12-27    TPro  6.8  /  Alb  3.8  /  TBili  0.4  /  DBili  x   /  AST  24  /  ALT  25  /  AlkPhos  112  12-26      Urinalysis Basic - ( 27 Dec 2024 06:15 )    Color: x / Appearance: x / SG: x / pH: x  Gluc: 216 mg/dL / Ketone: x  / Bili: x / Urobili: x   Blood: x / Protein: x / Nitrite: x   Leuk Esterase: x / RBC: x / WBC x   Sq Epi: x / Non Sq Epi: x / Bacteria: x        MICROBIOLOGY:          FluA/FluB/RSV/COVID PCR (12.27.24 @ 07:17)   SARS-CoV-2 Result: [facility]: This Respiratory Panel uses polymerase chain reaction (PCR) to detect for   influenza A; influenza B; respiratory syncytial virus; and SARS-CoV-2.   Test results should be correlated with clinical presentation, patient   history, and epidemiology.  Influenza A Result: Detected  Influenza B Result: NotDetec  Resp Syn Virus Result: NotDetec            RADIOLOGY:  imaging below personally reviewed  < from: [facility] Duplex Lower Ext Vein Scan, Bilat (12.27.24 @ 10:52) >    IMPRESSION:  No evidence of deep venous thrombosis in either lower extremity.    < end of copied text >      < from: Xray Chest 1 View- PORTABLE-Urgent (12.26.24 @ 23:19) >  IMPRESSION:  Clear lungs.    < end of copied text >   Patient is a 63y old  Male who presents with a chief complaint of CP (27 Dec 2024 11:34)      HPI:  63M former 30 pack-year smoker, quit 2001, PMH T2DM, HTN, HLD, GERD p/w CP, MANCILLA, cough. AOx4, accompanied by wife assisting in collateral, reports exertional CP when ascending incline surfaces x years, however x 2 weeks has experienced chest tightness/pressure at rest w/o radiation, in addition to dyspnea on minimal exertion. This occurs in context of cough with yellow-colored sputum production of similar duration accompanied by chills, for which he reportedly was evaluated by urgent care and prescribed azithromycin, followed by cefdinir, w/o sx abatement. Visited cardiology Dr. Frederic mariee for sx, underwent exercise stress test (also noting elevated calcium score previously with abnormal result prompting referral to ED for evaluation. Reports experiencing intermittent sx here in hospital, currently resolved. Not endorsing fever, lightheadedness, dizziness, numbness, weakness, tingling, diaphoresis, palpitations, abd pain, n/v/d, melena, hematochezia, dysuria, hematuria, LE swelling, or other complaints. Denies recent sick contact or travel.     lymphopenia, H/H 11.9/36.5 (normocytic); PT/INR prolonged; HST 24 -> 23, proBNP 61     CXR (prelim): Clear lungs.    s/p ASA 162mg PO    House cardiology c/s by ED, impression: no current e/o ACS, recommend c/s Dr. Mariee's group in AM  (27 Dec 2024 01:13)      Above reviewed.  Tmax 101.2, WBC WNL w/ lymphopenia. SCr 1.30. Procal 0.12. Influenza A positive on COVID/RSV/Flu swab. CXR clear. BLE Duplex neg for DVT.  Patient was seen and examined at bedside. +cough w/ sputum. White to beige recently. No fever thus far, except today. +chills. No diarrhea or dysuria.        prior hospital charts reviewed [  ] - no recent hospitalization  primary team notes reviewed [X]  other consultant notes reviewed [X]    PAST MEDICAL & SURGICAL HISTORY:  DM (diabetes mellitus)      HLD (hyperlipidemia)      GERD (gastroesophageal reflux disease)      H/O elbow surgery      H/O shoulder surgery          Allergies  penicillin (Rash)        ANTIMICROBIALS:      OTHER MEDS: MEDICATIONS  (STANDING):  acetaminophen     Tablet .. 650 every 6 hours PRN  aspirin  chewable 81 daily  benzonatate 100 three times a day PRN  dextrose 50% Injectable 25 once  dextrose 50% Injectable 12.5 once  dextrose 50% Injectable 25 once  dextrose Oral Gel 15 once PRN  glucagon  Injectable 1 once  heparin   Injectable 5000 every 8 hours  influenza   Vaccine 0.5 once  insulin glargine Injectable (LANTUS) 28 every morning  insulin lispro (ADMELOG) corrective regimen sliding scale  three times a day before meals  insulin lispro (ADMELOG) corrective regimen sliding scale  at bedtime  insulin lispro Injectable (ADMELOG) 9 three times a day before meals  lisinopril 20 daily  metoprolol succinate ER 25 daily  pantoprazole    Tablet 40 before breakfast  rosuvastatin 40 at bedtime  tamsulosin 0.4 at bedtime      SOCIAL HISTORY:     Former smoker, no IVDU    FAMILY HISTORY:  FH: CAD (coronary artery disease)        REVIEW OF SYSTEMS  [  ] ROS unobtainable because:    [X] All other systems negative except as noted below:	    Constitutional:  [X] fever [X] chills  [ ] weight loss  [ ] weakness  Skin:  [ ] rash [ ] phlebitis	  Eyes: [ ] icterus [ ] pain  [ ] discharge	  ENMT: [ ] sore throat  [ ] thrush [ ] ulcers [ ] exudates  Respiratory: [ ] dyspnea [ ] hemoptysis [X] cough [X] sputum	  Cardiovascular:  [ ] chest pain [ ] palpitations [ ] edema	  Gastrointestinal:  [ ] nausea [ ] vomiting [ ] diarrhea [ ] constipation [ ] pain	  Genitourinary:  [ ] dysuria [ ] frequency [ ] hematuria [ ] discharge [ ] flank pain  [ ] incontinence  Musculoskeletal:  [ ] myalgias [ ] arthralgias [ ] arthritis  [ ] back pain  Neurological:  [ ] headache [ ] seizures  [ ] confusion/altered mental status  Psychiatric:  [ ] anxiety [ ] depression	  Hematology/Lymphatics:  [ ] lymphadenopathy  Endocrine:  [ ] adrenal [ ] thyroid  Allergic/Immunologic:	 [ ] transplant [ ] seasonal    Vital Signs Last 24 Hrs  T(F): 99 (12-27-24 @ 11:35), Max: 101.2 (12-27-24 @ 04:25)    Vital Signs Last 24 Hrs  HR: 93 (12-27-24 @ 11:35) (87 - 104)  BP: 148/70 (12-27-24 @ 11:35) (123/66 - 148/70)  RR: 18 (12-27-24 @ 11:35)  SpO2: 98% (12-27-24 @ 11:35) (95% - 98%)  Wt(kg): --    PHYSICAL EXAM:  Constitutional: non-toxic, no distress  HEAD/EYES: anicteric, no conjunctival injection  ENT:  supple  Cardiovascular:   normal S1, S2, no murmur, RRR  Respiratory:  clear BS bilaterally, no wheezes  GI:  soft, non-tender  :  no mueller  Musculoskeletal:  no synovitis, normal ROM  Neurologic: awake and oriented x3  Skin:  no rash, no phlebitis  Heme/Onc: no lymphadenopathy   Psychiatric:  awake, alert, appropriate mood                                12.3   6.73  )-----------( 166      ( 27 Dec 2024 06:16 )             38.2       12-27    136  |  101  |  15  ----------------------------<  216[H]  4.4   |  23  |  0.84    Ca    9.2      27 Dec 2024 06:15  Phos  2.9     12-27  Mg     2.1     12-27    TPro  6.8  /  Alb  3.8  /  TBili  0.4  /  DBili  x   /  AST  24  /  ALT  25  /  AlkPhos  112  12-26    Procalcitonin (12.26.24 @ 22:24)   Procalcitonin: 0.12: This assay is intended for use to determine the change of PCT over time   as an aid in assessing the cumulative 28-day risk of all-cause mortality   for patients diagnosed with severe sepsis or septic shock in the ICU, or   when obtained in the emergency department or other medical wards prior to   ICU admission. This assay was performed by the [employer] PCT   assay. ng/mL      Urinalysis Basic - ( 27 Dec 2024 06:15 )    Color: x / Appearance: x / SG: x / pH: x  Gluc: 216 mg/dL / Ketone: x  / Bili: x / Urobili: x   Blood: x / Protein: x / Nitrite: x   Leuk Esterase: x / RBC: x / WBC x   Sq Epi: x / Non Sq Epi: x / Bacteria: x        MICROBIOLOGY:          FluA/FluB/RSV/COVID PCR (12.27.24 @ 07:17)   SARS-CoV-2 Result: [facility]: This Respiratory Panel uses polymerase chain reaction (PCR) to detect for   influenza A; influenza B; respiratory syncytial virus; and SARS-CoV-2.   Test results should be correlated with clinical presentation, patient   history, and epidemiology.  Influenza A Result: Detected  Influenza B Result: NotDete  Resp Syn Virus Result: NotDete            RADIOLOGY:  imaging below personally reviewed  < from: [facility] Duplex Lower Ext Vein Scan, Bilat (12.27.24 @ 10:52) >    IMPRESSION:  No evidence of deep venous thrombosis in either lower extremity.    < end of copied text >      < from: Xray Chest 1 View- PORTABLE-Urgent (12.26.24 @ 23:19) >  IMPRESSION:  Clear lungs.    < end of copied text >

## 2024-12-27 NOTE — CONSULT NOTE ADULT - ASSESSMENT
63-yo M w/ PMH T2DM and HTN, presenting for cough, sputum, and chest tightness, refractory to outpatient oral antibiotics, and w/ abnormal stress test. Cardiac eval pending. CXR neg, and minimal elevation of procal. COVID/RSV/Flu swab positive with Flu A.    Patient has new onset of fever while inpatient. Labs and CXR not suggestive of pneumonia. Given the new fever, would start oseltamivir.    #Flu A  #Fever  #Chest pain    Recommendations:  - Start oseltamivir 75 mg PO Q12H x5d  - Can send urine legionella and strep.    Plan discussed with primary team ACP.  Thank you for this consult.     Jayjay Miller MD, PhD  Attending Physician  Division of Infectious Diseases  Department of Medicine    Please contact through MS Teams message.  Office: [phone] (after 5 PM or weekend)

## 2024-12-28 LAB
A1C WITH ESTIMATED AVERAGE GLUCOSE RESULT: 8 % — HIGH (ref 4–5.6)
ESTIMATED AVERAGE GLUCOSE: 183 MG/DL — HIGH (ref 68–114)
GLUCOSE BLDC GLUCOMTR-MCNC: 185 MG/DL — HIGH (ref 70–99)
GLUCOSE BLDC GLUCOMTR-MCNC: 198 MG/DL — HIGH (ref 70–99)
GLUCOSE BLDC GLUCOMTR-MCNC: 279 MG/DL — HIGH (ref 70–99)
GLUCOSE BLDC GLUCOMTR-MCNC: 312 MG/DL — HIGH (ref 70–99)

## 2024-12-28 PROCEDURE — 99232 SBSQ HOSP IP/OBS MODERATE 35: CPT

## 2024-12-28 PROCEDURE — 71250 CT THORAX DX C-: CPT | Mod: 26

## 2024-12-28 RX ORDER — INSULIN LISPRO 100/ML
15 VIAL (ML) SUBCUTANEOUS ONCE
Refills: 0 | Status: COMPLETED | OUTPATIENT
Start: 2024-12-28 | End: 2024-12-28

## 2024-12-28 RX ORDER — GUAIFENESIN 100 MG/5ML
600 SYRUP ORAL EVERY 12 HOURS
Refills: 0 | Status: DISCONTINUED | OUTPATIENT
Start: 2024-12-28 | End: 2024-12-30

## 2024-12-28 RX ORDER — INSULIN GLARGINE-YFGN 100 [IU]/ML
34 INJECTION, SOLUTION SUBCUTANEOUS EVERY MORNING
Refills: 0 | Status: DISCONTINUED | OUTPATIENT
Start: 2024-12-28 | End: 2024-12-30

## 2024-12-28 RX ORDER — INSULIN LISPRO 100/ML
15 VIAL (ML) SUBCUTANEOUS
Refills: 0 | Status: DISCONTINUED | OUTPATIENT
Start: 2024-12-28 | End: 2024-12-30

## 2024-12-28 RX ADMIN — Medication 15 UNIT(S): at 13:06

## 2024-12-28 RX ADMIN — Medication 25 MILLIGRAM(S): at 05:16

## 2024-12-28 RX ADMIN — HEPARIN SODIUM 5000 UNIT(S): 1000 INJECTION, SOLUTION INTRAVENOUS; SUBCUTANEOUS at 13:06

## 2024-12-28 RX ADMIN — OSELTAMIVIR 75 MILLIGRAM(S): 75 CAPSULE ORAL at 17:00

## 2024-12-28 RX ADMIN — Medication 4: at 08:16

## 2024-12-28 RX ADMIN — PANTOPRAZOLE 40 MILLIGRAM(S): 40 TABLET, DELAYED RELEASE ORAL at 05:15

## 2024-12-28 RX ADMIN — Medication 100 MILLIGRAM(S): at 21:14

## 2024-12-28 RX ADMIN — OSELTAMIVIR 75 MILLIGRAM(S): 75 CAPSULE ORAL at 05:15

## 2024-12-28 RX ADMIN — Medication 1: at 16:56

## 2024-12-28 RX ADMIN — Medication 600 MILLIGRAM(S): at 17:00

## 2024-12-28 RX ADMIN — LISINOPRIL 20 MILLIGRAM(S): 30 TABLET ORAL at 05:15

## 2024-12-28 RX ADMIN — Medication 100 MILLIGRAM(S): at 05:15

## 2024-12-28 RX ADMIN — INSULIN GLARGINE-YFGN 28 UNIT(S): 100 INJECTION, SOLUTION SUBCUTANEOUS at 08:16

## 2024-12-28 RX ADMIN — Medication 600 MILLIGRAM(S): at 08:16

## 2024-12-28 RX ADMIN — HEPARIN SODIUM 5000 UNIT(S): 1000 INJECTION, SOLUTION INTRAVENOUS; SUBCUTANEOUS at 05:17

## 2024-12-28 RX ADMIN — ROSUVASTATIN 40 MILLIGRAM(S): 40 TABLET, FILM COATED ORAL at 21:13

## 2024-12-28 RX ADMIN — Medication 9 UNIT(S): at 08:17

## 2024-12-28 RX ADMIN — HEPARIN SODIUM 5000 UNIT(S): 1000 INJECTION, SOLUTION INTRAVENOUS; SUBCUTANEOUS at 21:15

## 2024-12-28 RX ADMIN — Medication 81 MILLIGRAM(S): at 11:49

## 2024-12-28 RX ADMIN — TAMSULOSIN HYDROCHLORIDE 0.4 MILLIGRAM(S): 0.4 CAPSULE ORAL at 21:13

## 2024-12-28 RX ADMIN — Medication 3: at 12:41

## 2024-12-28 RX ADMIN — Medication 15 UNIT(S): at 16:57

## 2024-12-28 NOTE — PROGRESS NOTE ADULT - SUBJECTIVE AND OBJECTIVE BOX
Patient is a 63y old  Male who presents with a chief complaint of CP (28 Dec 2024 04:15)      SUBJECTIVE / OVERNIGHT EVENTS: Patient seen and examined at bedside. No acute events overnight. He has a cough. He wants to know results of his CT coronaries. Said he did it yesterday at 2 PM.    MEDICATIONS  (STANDING):  aspirin  chewable 81 milliGRAM(s) Oral daily  dextrose 5%. 1000 milliLiter(s) (50 mL/Hr) IV Continuous <Continuous>  dextrose 5%. 1000 milliLiter(s) (100 mL/Hr) IV Continuous <Continuous>  dextrose 50% Injectable 25 Gram(s) IV Push once  dextrose 50% Injectable 12.5 Gram(s) IV Push once  dextrose 50% Injectable 25 Gram(s) IV Push once  glucagon  Injectable 1 milliGRAM(s) IntraMuscular once  guaiFENesin  milliGRAM(s) Oral every 12 hours  heparin   Injectable 5000 Unit(s) SubCutaneous every 8 hours  influenza   Vaccine 0.5 milliLiter(s) IntraMuscular once  insulin glargine Injectable (LANTUS) 28 Unit(s) SubCutaneous every morning  insulin lispro (ADMELOG) corrective regimen sliding scale   SubCutaneous three times a day before meals  insulin lispro (ADMELOG) corrective regimen sliding scale   SubCutaneous at bedtime  insulin lispro Injectable (ADMELOG) 9 Unit(s) SubCutaneous three times a day before meals  lisinopril 20 milliGRAM(s) Oral daily  metoprolol succinate ER 25 milliGRAM(s) Oral daily  oseltamivir 75 milliGRAM(s) Oral two times a day  pantoprazole    Tablet 40 milliGRAM(s) Oral before breakfast  rosuvastatin 40 milliGRAM(s) Oral at bedtime  tamsulosin 0.4 milliGRAM(s) Oral at bedtime    MEDICATIONS  (PRN):  acetaminophen     Tablet .. 650 milliGRAM(s) Oral every 6 hours PRN Temp greater or equal to 38C (100.4F), Mild Pain (1 - 3)  benzonatate 100 milliGRAM(s) Oral three times a day PRN Cough  dextrose Oral Gel 15 Gram(s) Oral once PRN Blood Glucose LESS THAN 70 milliGRAM(s)/deciliter      CAPILLARY BLOOD GLUCOSE      POCT Blood Glucose.: 197 mg/dL (27 Dec 2024 22:23)  POCT Blood Glucose.: 200 mg/dL (27 Dec 2024 19:17)  POCT Blood Glucose.: 167 mg/dL (27 Dec 2024 12:18)  POCT Blood Glucose.: 173 mg/dL (27 Dec 2024 09:25)  POCT Blood Glucose.: 189 mg/dL (27 Dec 2024 08:01)    I&O's Summary    27 Dec 2024 07:01  -  28 Dec 2024 07:00  --------------------------------------------------------  IN: 240 mL / OUT: 0 mL / NET: 240 mL        PHYSICAL EXAM:  Vital Signs Last 24 Hrs  T(C): 37.3 (28 Dec 2024 04:30), Max: 37.3 (28 Dec 2024 04:30)  T(F): 99.1 (28 Dec 2024 04:30), Max: 99.1 (28 Dec 2024 04:30)  HR: 92 (28 Dec 2024 04:30) (90 - 96)  BP: 136/60 (28 Dec 2024 04:30) (121/69 - 152/86)  BP(mean): --  RR: 18 (28 Dec 2024 04:30) (18 - 18)  SpO2: 94% (28 Dec 2024 04:30) (94% - 98%)    Parameters below as of 28 Dec 2024 04:30  Patient On (Oxygen Delivery Method): room air        GEN: male in NAD, appears comfortable, no diaphoresis  EYES: No scleral injection, EOMI  ENTM: neck supple & symmetric without tracheal deviation, moist membranes, no gross hearing impairment, thyroid gland not enlarged  CV: +S1/S2, no m/r/g, no abdominal bruit, no LE edema  RESP: breathing comfortably, no respiratory accessory muscle use, CTAB, no w/r/r  GI: normoactive BS, soft, NTND, no rebounding/guarding, no palpable masses    LABS:                        12.3   6.73  )-----------( 166      ( 27 Dec 2024 06:16 )             38.2     12-27    136  |  101  |  15  ----------------------------<  216[H]  4.4   |  23  |  0.84    Ca    9.2      27 Dec 2024 06:15  Phos  2.9     12-27  Mg     2.1     12-27    TPro  6.8  /  Alb  3.8  /  TBili  0.4  /  DBili  x   /  AST  24  /  ALT  25  /  AlkPhos  112  12-26    PT/INR - ( 26 Dec 2024 21:16 )   PT: 13.8 sec;   INR: 1.21 ratio         PTT - ( 26 Dec 2024 21:16 )  PTT:29.6 sec  CARDIAC MARKERS ( 26 Dec 2024 21:16 )  x     / x     / x     / x     / 2.2 ng/mL      Urinalysis Basic - ( 27 Dec 2024 06:15 )    Color: x / Appearance: x / SG: x / pH: x  Gluc: 216 mg/dL / Ketone: x  / Bili: x / Urobili: x   Blood: x / Protein: x / Nitrite: x   Leuk Esterase: x / RBC: x / WBC x   Sq Epi: x / Non Sq Epi: x / Bacteria: x          RADIOLOGY & ADDITIONAL TESTS:  Results Reviewed:   Imaging Personally Reviewed:  Electrocardiogram Personally Reviewed:    COORDINATION OF CARE:  Care Discussed with Consultants/Other Providers [Y/N]:  Prior or Outpatient Records Reviewed [Y/N]:

## 2024-12-28 NOTE — PROGRESS NOTE ADULT - ASSESSMENT
63M former 30 pack-year smoker, quit 2001, PMH T2DM, HTN, HLD, GERD p/w CP, MANCILLA, cough, referred by cardiology Dr. Mariee given abn outpatient stress test

## 2024-12-29 LAB
ANION GAP SERPL CALC-SCNC: 12 MMOL/L — SIGNIFICANT CHANGE UP (ref 5–17)
BUN SERPL-MCNC: 32 MG/DL — HIGH (ref 7–23)
CALCIUM SERPL-MCNC: 8.4 MG/DL — SIGNIFICANT CHANGE UP (ref 8.4–10.5)
CHLORIDE SERPL-SCNC: 95 MMOL/L — LOW (ref 96–108)
CO2 SERPL-SCNC: 24 MMOL/L — SIGNIFICANT CHANGE UP (ref 22–31)
CREAT SERPL-MCNC: 1.07 MG/DL — SIGNIFICANT CHANGE UP (ref 0.5–1.3)
EGFR: 78 ML/MIN/1.73M2 — SIGNIFICANT CHANGE UP
GLUCOSE BLDC GLUCOMTR-MCNC: 119 MG/DL — HIGH (ref 70–99)
GLUCOSE BLDC GLUCOMTR-MCNC: 208 MG/DL — HIGH (ref 70–99)
GLUCOSE BLDC GLUCOMTR-MCNC: 273 MG/DL — HIGH (ref 70–99)
GLUCOSE BLDC GLUCOMTR-MCNC: 312 MG/DL — HIGH (ref 70–99)
GLUCOSE SERPL-MCNC: 302 MG/DL — HIGH (ref 70–99)
HCT VFR BLD CALC: 35.2 % — LOW (ref 39–50)
HGB BLD-MCNC: 11.4 G/DL — LOW (ref 13–17)
MCHC RBC-ENTMCNC: 27.7 PG — SIGNIFICANT CHANGE UP (ref 27–34)
MCHC RBC-ENTMCNC: 32.4 G/DL — SIGNIFICANT CHANGE UP (ref 32–36)
MCV RBC AUTO: 85.6 FL — SIGNIFICANT CHANGE UP (ref 80–100)
NRBC # BLD: 0 /100 WBCS — SIGNIFICANT CHANGE UP (ref 0–0)
PLATELET # BLD AUTO: 157 K/UL — SIGNIFICANT CHANGE UP (ref 150–400)
POTASSIUM SERPL-MCNC: 3.9 MMOL/L — SIGNIFICANT CHANGE UP (ref 3.5–5.3)
POTASSIUM SERPL-SCNC: 3.9 MMOL/L — SIGNIFICANT CHANGE UP (ref 3.5–5.3)
RBC # BLD: 4.11 M/UL — LOW (ref 4.2–5.8)
RBC # FLD: 15.7 % — HIGH (ref 10.3–14.5)
SODIUM SERPL-SCNC: 131 MMOL/L — LOW (ref 135–145)
WBC # BLD: 5.95 K/UL — SIGNIFICANT CHANGE UP (ref 3.8–10.5)
WBC # FLD AUTO: 5.95 K/UL — SIGNIFICANT CHANGE UP (ref 3.8–10.5)

## 2024-12-29 PROCEDURE — 99152 MOD SED SAME PHYS/QHP 5/>YRS: CPT

## 2024-12-29 PROCEDURE — 92928 PRQ TCAT PLMT NTRAC ST 1 LES: CPT | Mod: LM

## 2024-12-29 PROCEDURE — 93010 ELECTROCARDIOGRAM REPORT: CPT

## 2024-12-29 PROCEDURE — 99233 SBSQ HOSP IP/OBS HIGH 50: CPT

## 2024-12-29 PROCEDURE — 93454 CORONARY ARTERY ANGIO S&I: CPT | Mod: 26,59

## 2024-12-29 RX ORDER — INSULIN LISPRO 100/ML
VIAL (ML) SUBCUTANEOUS
Refills: 0 | Status: DISCONTINUED | OUTPATIENT
Start: 2024-12-29 | End: 2024-12-30

## 2024-12-29 RX ORDER — POTASSIUM CHLORIDE 600 MG/1
10 TABLET, FILM COATED, EXTENDED RELEASE ORAL ONCE
Refills: 0 | Status: COMPLETED | OUTPATIENT
Start: 2024-12-29 | End: 2024-12-29

## 2024-12-29 RX ORDER — CHLORHEXIDINE GLUCONATE 1.2 MG/ML
1 RINSE ORAL
Refills: 0 | Status: DISCONTINUED | OUTPATIENT
Start: 2024-12-29 | End: 2024-12-30

## 2024-12-29 RX ORDER — INSULIN LISPRO 100/ML
VIAL (ML) SUBCUTANEOUS AT BEDTIME
Refills: 0 | Status: DISCONTINUED | OUTPATIENT
Start: 2024-12-29 | End: 2024-12-30

## 2024-12-29 RX ORDER — CLOPIDOGREL BISULFATE 75 MG/1
75 TABLET, FILM COATED ORAL DAILY
Refills: 0 | Status: DISCONTINUED | OUTPATIENT
Start: 2024-12-30 | End: 2024-12-30

## 2024-12-29 RX ADMIN — OSELTAMIVIR 75 MILLIGRAM(S): 75 CAPSULE ORAL at 18:06

## 2024-12-29 RX ADMIN — Medication 600 MILLIGRAM(S): at 05:56

## 2024-12-29 RX ADMIN — HEPARIN SODIUM 5000 UNIT(S): 1000 INJECTION, SOLUTION INTRAVENOUS; SUBCUTANEOUS at 05:56

## 2024-12-29 RX ADMIN — TAMSULOSIN HYDROCHLORIDE 0.4 MILLIGRAM(S): 0.4 CAPSULE ORAL at 21:52

## 2024-12-29 RX ADMIN — PANTOPRAZOLE 40 MILLIGRAM(S): 40 TABLET, DELAYED RELEASE ORAL at 05:55

## 2024-12-29 RX ADMIN — HEPARIN SODIUM 5000 UNIT(S): 1000 INJECTION, SOLUTION INTRAVENOUS; SUBCUTANEOUS at 21:53

## 2024-12-29 RX ADMIN — Medication 100 MILLIGRAM(S): at 05:56

## 2024-12-29 RX ADMIN — CHLORHEXIDINE GLUCONATE 1 APPLICATION(S): 1.2 RINSE ORAL at 21:58

## 2024-12-29 RX ADMIN — LISINOPRIL 20 MILLIGRAM(S): 30 TABLET ORAL at 05:56

## 2024-12-29 RX ADMIN — Medication 15 UNIT(S): at 11:11

## 2024-12-29 RX ADMIN — Medication 15 UNIT(S): at 18:30

## 2024-12-29 RX ADMIN — INSULIN GLARGINE-YFGN 34 UNIT(S): 100 INJECTION, SOLUTION SUBCUTANEOUS at 08:52

## 2024-12-29 RX ADMIN — ROSUVASTATIN 40 MILLIGRAM(S): 40 TABLET, FILM COATED ORAL at 21:52

## 2024-12-29 RX ADMIN — Medication 600 MILLIGRAM(S): at 18:03

## 2024-12-29 RX ADMIN — Medication 25 MILLIGRAM(S): at 05:56

## 2024-12-29 RX ADMIN — OSELTAMIVIR 75 MILLIGRAM(S): 75 CAPSULE ORAL at 05:56

## 2024-12-29 RX ADMIN — Medication 2: at 21:54

## 2024-12-29 RX ADMIN — Medication 100 MILLIGRAM(S): at 21:52

## 2024-12-29 RX ADMIN — Medication 8: at 08:52

## 2024-12-29 RX ADMIN — POTASSIUM CHLORIDE 10 MILLIEQUIVALENT(S): 600 TABLET, FILM COATED, EXTENDED RELEASE ORAL at 15:11

## 2024-12-29 RX ADMIN — Medication 4: at 11:12

## 2024-12-29 NOTE — DISCHARGE NOTE PROVIDER - NSDCCPCAREPLAN_GEN_ALL_CORE_FT
PRINCIPAL DISCHARGE DIAGNOSIS  Diagnosis: Coronary artery disease with unstable angina pectoris  Assessment and Plan of Treatment:

## 2024-12-29 NOTE — DISCHARGE NOTE PROVIDER - CARE PROVIDERS DIRECT ADDRESSES
[email or web address]

## 2024-12-29 NOTE — CHART NOTE - NSCHARTNOTEFT_GEN_A_CORE
Called by ED to evaluate patient    He was sent in by his primary cardiologist (Dr. Frederic Mariee) for an expedited left heart cath given abnormal treadmill stress test.    He is asymptomatic and hemodynamically stable. No current evidence of acute coronary syndrome.     Please consult Dr. Mariee's cardiology group in the morning for an official consult
MARTITA ALONSO  MRN-[id number]  Patient is a 64y old  Male who presents with a chief complaint of CP (29 Dec 2024 10:12)    HPI:  63M former 30 pack-year smoker, quit 2001, PMH T2DM, HTN, HLD, GERD p/w CP, MANCILLA, cough. Reports exertional CP when ascending incline surfaces x years, however x 2 weeks has experienced chest tightness/pressure at rest w/o radiation, in addition to dyspnea on minimal exertion. This occurs in context of cough with yellow-colored sputum production of similar duration accompanied by chills, for which he reportedly was evaluated by urgent care and prescribed azithromycin, followed by cefdinir, w/o sx abatement. Visited cardiology Dr. Mariee for sx, underwent exercise stress test (also noting elevated calcium score previously with abnormal result) and was referred to ED for evaluation.     Hospital Course: Admitted to medicine. Found to be Flu A+. Cardiology consulted and pt underwent coronary CTA, which was concerning for CAD, so pt [facility] where he received 1 DREAD to . Transferred to CICU from cath lab for further management.     REVIEW OF SYSTEMS:    CONSTITUTIONAL: No weakness, fevers or chills  EYES/ENT: No visual changes;  No vertigo or throat pain   NECK: No pain or stiffness  RESPIRATORY: No cough, wheezing, hemoptysis; No shortness of breath  CARDIOVASCULAR: No chest pain or palpitations  GASTROINTESTINAL: No abdominal or epigastric pain. No nausea, vomiting, or hematemesis; No diarrhea or constipation. No melena or hematochezia.  GENITOURINARY: No dysuria, frequency or hematuria  NEUROLOGICAL: No numbness or weakness  SKIN: No itching, rashes    ICU Vital Signs Last 24 Hrs  T(C): 36.7 (29 Dec 2024 10:38), Max: 36.7 (29 Dec 2024 10:38)  T(F): 98 (29 Dec 2024 10:38), Max: 98 (29 Dec 2024 10:38)  HR: 64 (29 Dec 2024 10:38) (64 - 119)  BP: 144/61 (29 Dec 2024 10:38) (110/65 - 144/61)  BP(mean): 80 (29 Dec 2024 10:38) (80 - 80)  RR: 20 (29 Dec 2024 10:38) (18 - 20)  SpO2: 99% (29 Dec 2024 10:38) (96% - 99%)    O2 Parameters below as of 29 Dec 2024 10:38  Patient On (Oxygen Delivery Method): room air    I&O's Summary    28 Dec 2024 07:01  -  29 Dec 2024 07:00  --------------------------------------------------------  IN: 240 mL / OUT: 0 mL / NET: 240 mL    CAPILLARY BLOOD GLUCOSE    POCT Blood Glucose.: 208 mg/dL (29 Dec 2024 11:07)    PHYSICAL EXAM:  GENERAL: No acute distress, well-nourished  HEAD:  Atraumatic, Normocephalic  EYES: EOMI, PERRLA, conjunctiva and sclera clear  NECK: Supple, no lymphadenopathy, no JVD  CHEST/LUNG: CTAB; No wheezes, rales, or rhonchi  HEART: Regular rate and rhythm. Normal S1/S2. No murmurs, rubs, or gallops  ABDOMEN: Soft, non-tender, non-distended; normal bowel sounds, no organomegaly  EXTREMITIES:  2+ peripheral pulses b/l, No clubbing, cyanosis, or edema, 6Fr sheath in RFA without bleeding or hematoma  NEUROLOGY: A&O x 3, no focal deficits  SKIN: No rashes or lesions    ============================I/O===========================   I&O's Detail    28 Dec 2024 07:01  -  29 Dec 2024 07:00  --------------------------------------------------------  IN:    Oral Fluid: 240 mL  Total IN: 240 mL    OUT:  Total OUT: 0 mL    Total NET: 240 mL    ============================ LABS =========================                        11.4   5.95  )-----------( 157      ( 29 Dec 2024 06:15 )             35.2     12-29    131[L]  |  95[L]  |  32[H]  ----------------------------<  302[H]  3.9   |  24  |  1.07    Ca    8.4      29 Dec 2024 06:15      Troponin T, High Sensitivity Result: 23 ng/L (12-27-24 @ 06:15)  Troponin T, High Sensitivity Result: 23 ng/L (12-26-24 @ 22:24)  Troponin T, High Sensitivity Result: 24 ng/L (12-26-24 @ 21:16)    CKMB Units: 2.2 ng/mL (12-26-24 @ 21:16)    Urinalysis Basic - ( 29 Dec 2024 06:15 )    Color: x / Appearance: x / SG: x / pH: x  Gluc: 302 mg/dL / Ketone: x  / Bili: x / Urobili: x   Blood: x / Protein: x / Nitrite: x   Leuk Esterase: x / RBC: x / WBC x   Sq Epi: x / Non Sq Epi: x / Bacteria: x    ======================Micro/Rad/Cardio=================  Telemetry: Reviewed   EKG: Reviewed  CXR: Reviewed  Cath: Reviewed   ======================================================  PAST MEDICAL & SURGICAL HISTORY:  DM (diabetes mellitus)    HLD (hyperlipidemia)    GERD (gastroesophageal reflux disease)    H/O elbow surgery    H/O shoulder surgery    ====================ASSESSMENT ======================  63M former 30 pack-year smoker, quit 2001, PMH T2DM, HTN, HLD, GERD p/w CP, MANCILLA, cough, referred by cardiology Dr. Mariee given abn outpatient stress test     Plan:  ====================== NEUROLOGY=====================  No active issues; A&Ox4  - neuro checks per CICU protocol    ==================== RESPIRATORY======================  #Cough  - Flu A+ on admission  - Tamiflu x5 days (12/27-  - CT Chest without signs of bacterial PNA  - SpO2 > 92% on RA  - Guaifenesin, tessalon daley prn    ====================CARDIOVASCULAR==================  #CAD   #Abnormal stress test    #Chest pain  - Sent from cardiologist office to ED for abnormal stress test and p/w chest pain  - Coronary CTA c/f CAD   - [facility] 12/29: DREAD x1 to ostial LM 90%  - TTE 12/27: EF 67  - GDMT  - c/w ASA, plavix, statin, BB, ACEi  - monitor on tele     #HLD  #HTN  - as above    ===================HEMATOLOGIC/ONC ===================  No active issues  - VA Duplex LE 12/27 negative for DVT  - trend CBC and transfuse for Hgb <7, plt <10k, <20k and febrile, or <50k and bleeding/pending invasive procedure    #DVT ppx  - HSQ    ===================== RENAL =========================  No active issues  - monitor SCr, UOP  - monitor electrolytes and replete for goal K 4-4.5 and Mg >2     #BPH  - c/w tamsulosin     ==================== GASTROINTESTINAL===================  #GERD  - c/w PPI  - c/w regular diet  - c/w bowel regimen    =======================    ENDOCRINE  =====================  #DM2, insulin dependent  - home reg: tresiba 60u BID with novolog  - ISS, lantus  - A1C 8%  - Endo following    ========================INFECTIOUS DISEASE================  #Flu  -  Flu A+ on admission  - Tamiflu x5 days (12/27-  - CT Chest without signs of bacterial PNA  - No leukocytosis, afebrile  - Blood Cx - NGTD  - ID following    =========================LINES========================  - PIVs  - 6Fr RFA sheath (to be removed today)    ======================= DISPOSITION  =====================    Patient requires continuous monitoring with bedside rhythm monitoring, pulse ox monitoring, and intermittent blood gas analysis. Care plan discussed with ICU care team. Patient remained critical and at risk for life threatening decompensation.  Patient seen, examined and plan discussed with CCU team during rounds.       SOHA Diaz  ACP Fellow
Patient was seen and examined. Labs, VS, chart notes reviewed.     Patient states he is still symptomatic with cough, chest pain, dyspnea. He states he was told by his cardiologist that he is getting cardiac cath early this morning.     - Called patient's outpatient cardiologist Dr. Mariee's office, they don't round on patients in the hospital. Per Dr. Crespo called house cards for consult.   - F/U cardiology regarding plan for [facility].  - Ordered CTA coronaries per cardiology recs.   - On ASA, statin, ACEI.   - Confirm recs for BB with cardiology team before starting.   - Patient febrile, Flu A +, discussed with patient, consult ID > F/U ID team for recs on Tamiflu dosing/ Antibiotic plan.  - Tessalon PRN, Tylenol PRN.  - CT chest pending.  - TTE pending.  - F/U UA, blood cx, urine cx, strep pn ag, legionella ag, procal, LE duplex US.    - 12/27: patient's wife updated at bedside.     Case and plan discussed with ACP: SANJAY Rodgers, clinical pharmacist, 
Removal of Femoral Sheath    Pulses in the right lower extremity are palpable. The patient was placed in the supine position. The insertion site was identified and the sutures were removed per protocol.  The 6 [place] femoral sheath was then removed. Direct pressure was applied for 21 minutes.     Monitoring of the right groin and both lower extremities including neuro-vascular checks and vital signs every 15 minutes x 4, then every 30 minutes x 2, then every 1 hour was ordered.    Complications: None

## 2024-12-29 NOTE — PROGRESS NOTE ADULT - PROBLEM SELECTOR PLAN 6
- HSQ VTE ppx  - CC/DASH diet  - fall precautions   - disposition pending course
- HSQ VTE ppx  - CC/DASH diet  - fall precautions   - disposition pending course

## 2024-12-29 NOTE — PROGRESS NOTE ADULT - PROBLEM SELECTOR PLAN 1
CXR w/o acute cardipulm path, EKG and HST trend not c/f ACS (house cardiology evaluated as well w/o c/f ACS)  pulses equal b/l    - TTE unremarkable  - monitor telemetry  - c/w home ASA, statin, ACE-i  - House cards following  - CT coronaries ordered, if concern for obstruction will proceed with C
CXR w/o acute cardipulm path, EKG and HST trend not c/f ACS (house cardiology evaluated as well w/o c/f ACS)  pulses equal b/l    - TTE unremarkable  - monitor telemetry  - c/w home ASA, statin, ACE-i  - House cards following  - Plan for [facility] on 12/29

## 2024-12-29 NOTE — CONSULT NOTE ADULT - ASSESSMENT
64y old Male with history of T2DM, HTN, HLD, GERD here for abnormal outpatient stress test and also flu positive.    1. T2DM with hyperglycemia  - Continue Lantus 34 units in the morning  - Continue Admelog 15 units before meals  - Change to moderate Admelog correctional scale before meals and bedtime  - Monitor FS before meals and bedtime  - Follow hospital hypoglycemic protocol    2. Influenza  - on Tamiflu  - ID following    Will continue to follow.     Harris Oneal MD  [facility]- Division of Endocrinology    [address] [place] [address]    T [phone]  F [phone]

## 2024-12-29 NOTE — DISCHARGE NOTE PROVIDER - PROVIDER TOKENS
Initial Anesthesia Post-op Note    Patient: Benny Richard  Procedure(s) Performed: EXAM UNDER ANESTHESIA, RECTUMEXAM UNDER ANESTHESIA, VAGINACOLONOSCOPY, WITH FOREIGN BODY REMOVAL  Anesthesia type: General      Patient Location: PACU Phase 1  Post-op Vital Signs:stable  Level of Consciousness: responds to stimulation and awake  Respiratory Status: spontaneous ventilation and unassisted  Cardiovascular blood pressure returned to baseline  Hydration: euvolemic  Pain Management: adequately controlled  Handoff: Handoff to receiving nurse was performed and questions were answered  Nausea: None  Airway Patency:patent  Post-op Assessment: patient tolerated procedure well with no complications  Comments: Pt. to PACU, Supplemental O2 as needed. VSS, report to PACU RN.  Pt awake, [facility] Patient care tech checking both hand and leg restraints.       No complications documented.  
PROVIDER:[TOKEN:[[id number]],FOLLOWUP:[2 weeks],ESTABLISHEDPATIENT:[T]],PROVIDER:[TOKEN:[[id number]],FOLLOWUP:[Routine],ESTABLISHEDPATIENT:[T]]

## 2024-12-29 NOTE — PROGRESS NOTE ADULT - PROBLEM SELECTOR PLAN 3
home tresiba 60u BID with novolog  did not receive insulin in ED  - Basal-bolus as ordered, can resume home regimen on DC  - Endocrine consulted  - Said A1c was 8 as outpatient (follows at [facility])
home tresiba 60u BID  did not receive insulin in ED  - will dose weight based 28u lantus (with dose stat) and 9u admelog pre-meal TID, SHILO and monitor FS. If not controlled would consider endocrine assistance in AM  - Said A1c was 8 as outpatient (follows at [facility])

## 2024-12-29 NOTE — PROGRESS NOTE ADULT - SUBJECTIVE AND OBJECTIVE BOX
Patient is a 64y old  Male who presents with a chief complaint of CP (29 Dec 2024 05:32)      SUBJECTIVE / OVERNIGHT EVENTS: Patient seen and examined at bedside. No acute events overnight. Cough improving. No dyspnea.    MEDICATIONS  (STANDING):  aspirin  chewable 81 milliGRAM(s) Oral daily  dextrose 5%. 1000 milliLiter(s) (50 mL/Hr) IV Continuous <Continuous>  dextrose 5%. 1000 milliLiter(s) (100 mL/Hr) IV Continuous <Continuous>  dextrose 50% Injectable 25 Gram(s) IV Push once  dextrose 50% Injectable 12.5 Gram(s) IV Push once  dextrose 50% Injectable 25 Gram(s) IV Push once  glucagon  Injectable 1 milliGRAM(s) IntraMuscular once  guaiFENesin  milliGRAM(s) Oral every 12 hours  heparin   Injectable 5000 Unit(s) SubCutaneous every 8 hours  influenza   Vaccine 0.5 milliLiter(s) IntraMuscular once  insulin glargine Injectable (LANTUS) 34 Unit(s) SubCutaneous every morning  insulin lispro (ADMELOG) corrective regimen sliding scale   SubCutaneous three times a day before meals  insulin lispro (ADMELOG) corrective regimen sliding scale   SubCutaneous at bedtime  insulin lispro Injectable (ADMELOG) 15 Unit(s) SubCutaneous three times a day before meals  lisinopril 20 milliGRAM(s) Oral daily  metoprolol succinate ER 25 milliGRAM(s) Oral daily  oseltamivir 75 milliGRAM(s) Oral two times a day  pantoprazole    Tablet 40 milliGRAM(s) Oral before breakfast  rosuvastatin 40 milliGRAM(s) Oral at bedtime  tamsulosin 0.4 milliGRAM(s) Oral at bedtime    MEDICATIONS  (PRN):  acetaminophen     Tablet .. 650 milliGRAM(s) Oral every 6 hours PRN Temp greater or equal to 38C (100.4F), Mild Pain (1 - 3)  benzonatate 100 milliGRAM(s) Oral three times a day PRN Cough  dextrose Oral Gel 15 Gram(s) Oral once PRN Blood Glucose LESS THAN 70 milliGRAM(s)/deciliter      CAPILLARY BLOOD GLUCOSE      POCT Blood Glucose.: 312 mg/dL (29 Dec 2024 08:10)  POCT Blood Glucose.: 185 mg/dL (28 Dec 2024 21:45)  POCT Blood Glucose.: 198 mg/dL (28 Dec 2024 16:50)  POCT Blood Glucose.: 279 mg/dL (28 Dec 2024 12:05)    I&O's Summary    28 Dec 2024 07:01  -  29 Dec 2024 07:00  --------------------------------------------------------  IN: 240 mL / OUT: 0 mL / NET: 240 mL        PHYSICAL EXAM:  Vital Signs Last 24 Hrs  T(C): 36.5 (29 Dec 2024 05:30), Max: 36.5 (28 Dec 2024 11:46)  T(F): 97.7 (29 Dec 2024 05:30), Max: 97.7 (28 Dec 2024 11:46)  HR: 67 (29 Dec 2024 05:30) (67 - 119)  BP: 113/65 (29 Dec 2024 05:30) (110/65 - 122/81)  BP(mean): --  RR: 18 (29 Dec 2024 05:30) (18 - 18)  SpO2: 98% (29 Dec 2024 05:30) (96% - 98%)    Parameters below as of 29 Dec 2024 05:30  Patient On (Oxygen Delivery Method): room air        GEN: male in NAD, appears comfortable, no diaphoresis  EYES: No scleral injection, EOMI  ENTM: neck supple & symmetric without tracheal deviation, moist membranes, no gross hearing impairment, thyroid gland not enlarged  CV: +S1/S2, no m/r/g, no abdominal bruit, no LE edema  RESP: breathing comfortably, no respiratory accessory muscle use, CTAB, no w/r/r  GI: normoactive BS, soft, NTND, no rebounding/guarding, no palpable masses    LABS:                        11.4   5.95  )-----------( 157      ( 29 Dec 2024 06:15 )             35.2     12-29    131[L]  |  95[L]  |  32[H]  ----------------------------<  302[H]  3.9   |  24  |  1.07    Ca    8.4      29 Dec 2024 06:15            Urinalysis Basic - ( 29 Dec 2024 06:15 )    Color: x / Appearance: x / SG: x / pH: x  Gluc: 302 mg/dL / Ketone: x  / Bili: x / Urobili: x   Blood: x / Protein: x / Nitrite: x   Leuk Esterase: x / RBC: x / WBC x   Sq Epi: x / Non Sq Epi: x / Bacteria: x        Culture - Blood (collected 27 Dec 2024 17:03)  Source: .Blood BLOOD  Preliminary Report (28 Dec 2024 23:08):    No growth at 24 hours        RADIOLOGY & ADDITIONAL TESTS:  Results Reviewed:   Imaging Personally Reviewed:  Electrocardiogram Personally Reviewed:    COORDINATION OF CARE:  Care Discussed with Consultants/Other Providers [Y/N]:  Prior or Outpatient Records Reviewed [Y/N]:

## 2024-12-29 NOTE — PROGRESS NOTE ADULT - NUTRITIONAL ASSESSMENT
Diet, Regular:   Consistent Carbohydrate {No Snacks} (CSTCHO)  DASH/TLC {Sodium & Cholesterol Restricted} (DASH) (12-27-24 @ 03:10) [Active]

## 2024-12-29 NOTE — PROGRESS NOTE ADULT - NSPROGADDITIONALINFOA_GEN_ALL_CORE
Likely DC today after [facility] (also depends on what they do)    DC Time: 35 minutes Likely DC today after [facility] (also depends on what they do)    [facility]

## 2024-12-29 NOTE — DISCHARGE NOTE PROVIDER - NSDCMRMEDTOKEN_GEN_ALL_CORE_FT
aspirin 81 mg oral tablet, chewable: 1 tab(s) chewed once a day  Flomax 0.4 mg oral capsule: 1 cap(s) orally once a day  Jardiance 25 mg oral tablet: 1 tab(s) orally once a day  MetFORMIN (Eqv-Fortamet) 1000 mg oral tablet, extended release: 1 tab(s) orally once a day  NovoLOG 100 units/mL injectable solution: injectable sliding scale TID pre-meal  omeprazole 40 mg oral delayed release capsule: 1 cap(s) orally once a day  ramipril 5 mg oral capsule: 1 cap(s) orally once a day  rosuvastatin 40 mg oral capsule: 1 cap(s) orally once a day (at bedtime)  Tresiba 100 units/mL subcutaneous solution: 60 unit(s) subcutaneous 2 times a day   aspirin 81 mg oral tablet, chewable: 1 tab(s) chewed once a day  Cardiac Rehab: 2-3 times per week x 12 weeks  Diagnosis: Post PCI -  ***Please note, in addition to this, you will also require a formal referral and/or prescription from your outpatient cardiologist in order to enroll in rehab  Flomax 0.4 mg oral capsule: 1 cap(s) orally once a day  Jardiance 25 mg oral tablet: 1 tab(s) orally once a day  MetFORMIN (Eqv-Fortamet) 1000 mg oral tablet, extended release: 1 tab(s) orally once a day  NovoLOG 100 units/mL injectable solution: injectable sliding scale TID pre-meal  omeprazole 40 mg oral delayed release capsule: 1 cap(s) orally once a day  ramipril 5 mg oral capsule: 1 cap(s) orally once a day  rosuvastatin 40 mg oral capsule: 1 cap(s) orally once a day (at bedtime)  Tresiba 100 units/mL subcutaneous solution: 60 unit(s) subcutaneous 2 times a day   aspirin 81 mg oral tablet, chewable: 1 tab(s) chewed once a day  Cardiac Rehab: 2-3 times per week x 12 weeks  Diagnosis: Post PCI -  ***Please note, in addition to this, you will also require a formal referral and/or prescription from your outpatient cardiologist in order to enroll in rehab  clopidogrel 75 mg oral tablet: 1 tab(s) orally once a day  Flomax 0.4 mg oral capsule: 1 cap(s) orally once a day  Jardiance 25 mg oral tablet: 1 tab(s) orally once a day  MetFORMIN (Eqv-Fortamet) 1000 mg oral tablet, extended release: 1 tab(s) orally once a day  metoprolol succinate 25 mg oral tablet, extended release: 1 tab(s) orally once a day  NovoLOG 100 units/mL injectable solution: injectable sliding scale TID pre-meal  omeprazole 40 mg oral delayed release capsule: 1 cap(s) orally once a day  oseltamivir 75 mg oral capsule: 1 cap(s) orally 2 times a day  ramipril 5 mg oral capsule: 1 cap(s) orally once a day  rosuvastatin 40 mg oral capsule: 1 cap(s) orally once a day (at bedtime)  Tresiba 100 units/mL subcutaneous solution: 60 unit(s) subcutaneous 2 times a day

## 2024-12-29 NOTE — DISCHARGE NOTE PROVIDER - HOSPITAL COURSE
Cardiac Rehab Post PCI:              *Education on benefits of Cardiac Rehab provided to patient: Yes         *Referral and Prescription Given for Cardiac Rehab : Yes         *Pt given list of locations & instructed to contact their insurance company to review list of participating providers: Yes         *Pt instructed to bring Cardiac Rehab prescription with them to Cardiology Follow up appointment for assistance with enrollment: Yes         *Pt discharged with copies detail cardiovascular history, medications, testing/treatments: Yes     64y male PMH  former 30 pack-year smoker quit 2001, T2DM, HTN, HLD, GERD presented with chest pain, MANCILLA and cough underwent an abnormal outpatient stress test, Influenza A positive upon admission s/p C DREAD x1 to ostial LM 90% via RFA 12/29/24. Rest of hospital course unremarkable, patient is chest pain free. Treated with Tamiflu. To be sent out with DAPT, metoprolol, and Tamiflu (12/27-12/31) in addition to all previous home medications. Cardiology follow-up arranged with Dr. Mariee.       Cardiac Rehab Post PCI:              *Education on benefits of Cardiac Rehab provided to patient: Yes         *Referral and Prescription Given for Cardiac Rehab : Yes         *Pt given list of locations & instructed to contact their insurance company to review list of participating providers: Yes         *Pt instructed to bring Cardiac Rehab prescription with them to Cardiology Follow up appointment for assistance with enrollment: Yes         *Pt discharged with copies detail cardiovascular history, medications, testing/treatments: Yes

## 2024-12-29 NOTE — CONSULT NOTE ADULT - SUBJECTIVE AND OBJECTIVE BOX
[facility] Initial Consult Note    HPI  64y old Male with history of T2DM, HTN, HLD, GERD here for abnormal outpatient stress test and also flu positive.    History was obtained from patient and chart review.    Diabetes history: T2DM for 32 years and on insulin for a while  Home regimen: Tresiba 60 units BID, Novolog 15-25 units before meals, Jardiance 25 mg daily and Metformin  Home FS: uses Jennifer 2, reviewed reader, time in range 28% the last 14 days  A1C: 8%  Outpatient endocrinologist: with [place]     Vital Signs Last 24 Hrs  T(C): 36.5 (12-28-24 @ 20:39), Max: 36.5 (12-28-24 @ 11:46)  HR: 74 (12-28-24 @ 20:39) (74 - 119)  BP: 110/65 (12-28-24 @ 20:39) (110/65 - 122/81)  RR: 18 (12-28-24 @ 20:39) (18 - 18)  SpO2: 96% (12-28-24 @ 20:39) (96% - 97%)    Physical Exam  Gen: NAD, alert, awake  HEENT: NC/AT, moist mucous membranes  Chest: normal respiratory effort  Heart: +S1 S2  Neuro: normal mood and affect    Medications  acetaminophen     Tablet .. 650 milliGRAM(s) Oral every 6 hours PRN  aspirin  chewable 81 milliGRAM(s) Oral daily  benzonatate 100 milliGRAM(s) Oral three times a day PRN  dextrose 5%. 1000 milliLiter(s) IV Continuous <Continuous>  dextrose 5%. 1000 milliLiter(s) IV Continuous <Continuous>  dextrose 50% Injectable 25 Gram(s) IV Push once  dextrose 50% Injectable 12.5 Gram(s) IV Push once  dextrose 50% Injectable 25 Gram(s) IV Push once  dextrose Oral Gel 15 Gram(s) Oral once PRN  glucagon  Injectable 1 milliGRAM(s) IntraMuscular once  guaiFENesin  milliGRAM(s) Oral every 12 hours  heparin   Injectable 5000 Unit(s) SubCutaneous every 8 hours  influenza   Vaccine 0.5 milliLiter(s) IntraMuscular once  insulin glargine Injectable (LANTUS) 34 Unit(s) SubCutaneous every morning  insulin lispro (ADMELOG) corrective regimen sliding scale   SubCutaneous three times a day before meals  insulin lispro (ADMELOG) corrective regimen sliding scale   SubCutaneous at bedtime  insulin lispro Injectable (ADMELOG) 15 Unit(s) SubCutaneous three times a day before meals  lisinopril 20 milliGRAM(s) Oral daily  metoprolol succinate ER 25 milliGRAM(s) Oral daily  oseltamivir 75 milliGRAM(s) Oral two times a day  pantoprazole    Tablet 40 milliGRAM(s) Oral before breakfast  rosuvastatin 40 milliGRAM(s) Oral at bedtime  tamsulosin 0.4 milliGRAM(s) Oral at bedtime    Pertinent labs/imaging  POCT Blood Glucose.: 185 mg/dL (12-28-24 @ 21:45)  POCT Blood Glucose.: 198 mg/dL (12-28-24 @ 16:50)  POCT Blood Glucose.: 279 mg/dL (12-28-24 @ 12:05)  POCT Blood Glucose.: 312 mg/dL (12-28-24 @ 07:58)    eGFR: 99 mL/min/1.73m2 (12-27-24 @ 06:15)

## 2024-12-29 NOTE — DISCHARGE NOTE PROVIDER - NSDCCPTREATMENT_GEN_ALL_CORE_FT
PRINCIPAL PROCEDURE  Procedure: Left heart cardiac cath  Findings and Treatment: DREAD x1 to ostial LM - 90% stenosis, LAD with mild atherosclerosis      SECONDARY PROCEDURE  Procedure: Echocardiography, 2D  Findings and Treatment: EF 67%

## 2024-12-29 NOTE — PROGRESS NOTE ADULT - ASSESSMENT
====================ASSESSMENT ======================  63M former 30 pack-year smoker, quit 2001, PMH T2DM, HTN, HLD, GERD p/w CP, MANCILLA, cough, referred by cardiology Dr. Mariee given abn outpatient stress test     Plan:  ====================== NEUROLOGY=====================  No active issues; A&Ox4  - neuro checks per CICU protocol    ==================== RESPIRATORY======================  #Cough  - Flu A+ on admission  - Tamiflu x5 days (12/27-  - CT Chest without signs of bacterial PNA  - SpO2 > 92% on RA  - Guaifenesin, tessalon daley prn    ====================CARDIOVASCULAR==================  #CAD   #Abnormal stress test    #Chest pain  - Sent from cardiologist office to ED for abnormal stress test and p/w chest pain  - Coronary CTA c/f CAD   - LHC 12/29: DREAD x1 to ostial LM 90%  - TTE 12/27: EF 67  - GDMT  - c/w ASA, plavix, statin, BB, ACEi  - monitor on tele     #HLD  #HTN  - as above    ===================HEMATOLOGIC/ONC ===================  No active issues  - VA Duplex LE 12/27 negative for DVT  - trend CBC and transfuse for Hgb <7, plt <10k, <20k and febrile, or <50k and bleeding/pending invasive procedure    #DVT ppx  - HSQ    ===================== RENAL =========================  No active issues  - monitor SCr, UOP  - monitor electrolytes and replete for goal K 4-4.5 and Mg >2     #BPH  - c/w tamsulosin     ==================== GASTROINTESTINAL===================  #GERD  - c/w PPI  - c/w regular diet  - c/w bowel regimen    =======================    ENDOCRINE  =====================  #DM2, insulin dependent  - home reg: tresiba 60u BID with novolog  - ISS, lantus  - A1C 8%  - Endo following    ========================INFECTIOUS DISEASE================  #Flu  -  Flu A+ on admission  - Tamiflu x5 days (12/27-  - CT Chest without signs of bacterial PNA  - No leukocytosis, afebrile  - Blood Cx - NGTD  - ID following    Patient requires continuous monitoring with bedside rhythm monitoring, pulse ox monitoring, and intermittent blood gas analysis. Care plan discussed with ICU care team. Patient remained critical and at risk for life threatening decompensation.  Patient seen, examined and plan discussed with CCU team during rounds.     I have personally provided 35 minutes of critical care time excluding time spent on separate procedures, in addition to initial critical care time provided by the CICU Attending, Dr. Cooper.    Bety Lyle, [facility]  ====================ASSESSMENT ======================  63M former 30 pack-year smoker, quit 2001, PMH T2DM, HTN, HLD, GERD p/w CP, MANCILLA, cough, referred by cardiology Dr. Mariee given abn outpatient stress test     Plan:  ====================== NEUROLOGY=====================  - No active issues; A&Ox4  - Neuro checks per CICU protocol    ==================== RESPIRATORY======================  # Cough  - Flu A+ on admission  - Tamiflu x5 days (12/27-  - CT Chest without signs of bacterial PNA  - SpO2 > 92% on RA  - Guaifenesin, tessalon daley prn    ====================CARDIOVASCULAR==================  # CAD   # Abnormal stress test    # Chest pain  - Sent from cardiologist office to ED for abnormal stress test and p/w chest pain  - Coronary CTA c/f CAD   - LHC 12/29: DREAD x1 to ostial LM 90%  - TTE 12/27: EF 67  - GDMT  - c/w ASA, plavix, statin, BB, ACEi      #HLD  #HTN  - as above    ===================HEMATOLOGIC/ONC ===================  No active issues  - VA Duplex LE 12/27 negative for DVT  - trend CBC and transfuse for Hgb <7, plt <10k, <20k and febrile, or <50k and bleeding/pending invasive procedure    #DVT ppx  - HSQ    ===================== RENAL =========================  No active issues  - monitor SCr, UOP  - monitor electrolytes and replete for goal K 4-4.5 and Mg >2     #BPH  - c/w tamsulosin     ==================== GASTROINTESTINAL===================  #GERD  - c/w PPI  - c/w regular diet  - c/w bowel regimen    =======================    ENDOCRINE  =====================  #DM2, insulin dependent  - home reg: tresiba 60u BID with novolog  - ISS, lantus  - A1C 8%  - Endo following    ========================INFECTIOUS DISEASE================  # Flu  -  Flu A+ on admission  - Tamiflu x5 days (12/27-  - CT Chest without signs of bacterial PNA  - No leukocytosis, afebrile  - Blood Cx - NGTD  - ID following    Patient requires continuous monitoring with bedside rhythm monitoring, pulse ox monitoring, and intermittent blood gas analysis. Care plan discussed with ICU care team. Patient remained critical and at risk for life threatening decompensation.  Patient seen, examined and plan discussed with CCU team during rounds.     I have personally provided 35 minutes of critical care time excluding time spent on separate procedures, in addition to initial critical care time provided by the CICU Attending, Dr. Greer.    Bety Lyle, [facility]  ====================ASSESSMENT ======================  63M former 30 pack-year smoker, quit 2001, PMH T2DM, HTN, HLD, GERD p/w CP, MANCILLA, cough, referred by cardiology Dr. Mariee given abn outpatient stress test     Plan:  ====================== NEUROLOGY=====================  - No active issues; A&Ox4  - Neuro checks per CICU protocol    ==================== RESPIRATORY======================  # Flu A+ on admission  - Tamiflu x5 days (12/27-  - CT Chest without signs of bacterial PNA  - SpO2 > 92% on RA  - Guaifenesin, tessalon PRN    ====================CARDIOVASCULAR==================  # CAD   # Abnormal stress test    # Chest pain  - Sent from cardiologist office to ED for abnormal stress test and p/w chest pain  - Coronary CTA c/f CAD   - LHC 12/29: DREAD x1 to ostial LM 90%  - TTE 12/27: EF 67  - GDMT  - c/w ASA, plavix, statin  - c/w BB, ACEi  - c/w statin    # HLD  # HTN  - As above    ===================== RENAL =========================  - No active issues  - Monitor SCr, UOP  - Monitor electrolytes and replete for goal K 4-4.5 and Mg >2     # BPH  - c/w tamsulosin     ==================== GASTROINTESTINAL===================  # GERD  - c/w PPI  - c/w regular diet  - c/w bowel regimen    =======================    ENDOCRINE  =====================  # DMT2, insulin dependent  - Home reg: tresiba 60u BID with novolog  - c/w ISS, lantus  - A1C 8%  - Endo following    ===================HEMATOLOGIC/ONC ===================  - No active issues  - VA Duplex LE 12/27 negative for DVT  - Trend CBC and transfuse for Hgb <7, plt <10k, <20k and febrile, or <50k and bleeding/pending invasive procedure    # VTE ppx  - HSQ    ========================INFECTIOUS DISEASE================  # Flu  -  Flu A+ on admission  - Tamiflu x5 days (12/27-  - CT Chest without signs of bacterial PNA  - No leukocytosis, afebrile  - Blood Cx - NGTD  - ID following    Patient requires continuous monitoring with bedside rhythm monitoring, pulse ox monitoring, and intermittent blood gas analysis. Care plan discussed with ICU care team. Patient remained critical and at risk for life threatening decompensation.  Patient seen, examined and plan discussed with CCU team during rounds.     I have personally provided 35 minutes of critical care time excluding time spent on separate procedures, in addition to initial critical care time provided by the CICU Attending, Dr. Greer.    Bety Lyle, [facility]

## 2024-12-29 NOTE — DISCHARGE NOTE PROVIDER - CARE PROVIDER_API CALL
Frederic Mariee  Cardiovascular Disease  [address], [address]  [place] [address]  Phone: [phone]  Fax: [phone]  Established Patient  Follow Up Time: 2 weeks    Pravin Christianson  Internal Medicine  [facility], [address]  [place] [address]  Phone: [phone]  Fax: [phone]  Established Patient  Follow Up Time: Routine

## 2024-12-29 NOTE — CONSULT NOTE ADULT - REASON FOR ADMISSION
CP
Subjective:      Patient ID: Kathy Hector is a 47 y.o. female. HPI  Dr. Amparo Cannon, APRN - CNP has requested that I see Kathy Hector for a consult for   1. Nonalcoholic fatty liver disease without nonalcoholic steatohepatitis (LEIVA)    2. Diarrhea, unspecified type    3. Rectal bleeding    4. Nausea    5. Abdominal pain, epigastric     . Patient is here for first time, Diarrhea (states has been ongoing for about 2 months with occasional rectal bleeding, states has about 6-7 BM daily with loose to watery stools, does have issues with urgency and happens soon after eating . does have associated nauses with upper abdominal pain )    Here for the first time    diarrhea, abd cramps, nausea started started Oct 48 Cathie Kaiser to ER 10/23 /2018   Stool studies were all negative     Was given omeprazole/carafet but not taking    Cbc LFT// tavon/lipase were normal     Impression   Negative gallbladder ultrasound.            Impression   1. No CT evidence for acute intra-abdominal process. 2. Stable findings including benign left adrenal gland adenoma and findings   suspected related to involuted right multicystic dysplastic kidney.                 Past Medical, Family, and Social History reviewed and does contribute to the patient presenting condition. patient\"s PMH/PSH,SH,PSYCH hx, MEDs, ALLERGIES, and ROS was all reviewed and updated ion the appropriate sections    Review of Systems   Constitutional: Negative. HENT: Negative. Eyes: Positive for visual disturbance (wears glasses). Respiratory: Positive for cough. Cardiovascular: Positive for palpitations. Gastrointestinal: Positive for abdominal distention, abdominal pain, anal bleeding, blood in stool, diarrhea and nausea. Negative for constipation, rectal pain and vomiting. Endocrine: Negative. Genitourinary: Negative. Musculoskeletal: Positive for arthralgias, back pain and myalgias. Skin: Negative.     Allergic/Immunologic:
CP
CP

## 2024-12-29 NOTE — DISCHARGE NOTE PROVIDER - NSDCFUSCHEDAPPT_GEN_ALL_CORE_FT
Frederic Mariee  [facility] Physician On license of [facility]  CARDIOLOGY [address]   Scheduled Appointment: 01/06/2025

## 2024-12-29 NOTE — PROGRESS NOTE ADULT - SUBJECTIVE AND OBJECTIVE BOX
PATIENT:  MARTITA ALONSO  [id number]    CHIEF COMPLAINT:  Patient is a 64y old  Male who presents with a chief complaint of CP (29 Dec 2024 10:12)      INTERVAL HISTORYOVERNIGHT EVENTS:      REVIEW OF SYSTEMS:    Constitutional:     [ ] negative [ ] fevers [ ] chills [ ] weight loss [ ] weight gain  HEENT:                  [ ] negative [ ] dry eyes [ ] eye irritation [ ] postnasal drip [ ] nasal congestion  CV:                         [ ] negative  [ ] chest pain [ ] orthopnea [ ] palpitations [ ] murmur  Resp:                     [ ] negative [ ] cough [ ] shortness of breath [ ] dyspnea [ ] wheezing [ ] sputum [ ] hemoptysis  GI:                          [ ] negative [ ] nausea [ ] vomiting [ ] diarrhea [ ] constipation [ ] abd pain [ ] dysphagia   :                        [ ] negative [ ] dysuria [ ] nocturia [ ] hematuria [ ] increased urinary frequency  Musculoskeletal: [ ] negative [ ] back pain [ ] myalgias [ ] arthralgias [ ] fracture  Skin:                       [ ] negative [ ] rash [ ] itch  Neurological:        [ ] negative [ ] headache [ ] dizziness [ ] syncope [ ] weakness [ ] numbness  Psychiatric:           [ ] negative [ ] anxiety [ ] depression  Endocrine:            [ ] negative [ ] diabetes [ ] thyroid problem  Heme/Lymph:      [ ] negative [ ] anemia [ ] bleeding problem  Allergic/Immune: [ ] negative [ ] itchy eyes [ ] nasal discharge [ ] hives [ ] angioedema    [ ] All other systems negative  [ ] Unable to assess ROS because ________.    MEDICATIONS:  MEDICATIONS  (STANDING):  aspirin  chewable 81 milliGRAM(s) Oral daily  chlorhexidine 2% Cloths 1 Application(s) Topical <User Schedule>  dextrose 5%. 1000 milliLiter(s) (100 mL/Hr) IV Continuous <Continuous>  dextrose 5%. 1000 milliLiter(s) (50 mL/Hr) IV Continuous <Continuous>  glucagon  Injectable 1 milliGRAM(s) IntraMuscular once  guaiFENesin  milliGRAM(s) Oral every 12 hours  heparin   Injectable 5000 Unit(s) SubCutaneous every 8 hours  influenza   Vaccine 0.5 milliLiter(s) IntraMuscular once  insulin glargine Injectable (LANTUS) 34 Unit(s) SubCutaneous every morning  insulin lispro (ADMELOG) corrective regimen sliding scale   SubCutaneous three times a day before meals  insulin lispro (ADMELOG) corrective regimen sliding scale   SubCutaneous at bedtime  insulin lispro Injectable (ADMELOG) 15 Unit(s) SubCutaneous three times a day before meals  lisinopril 20 milliGRAM(s) Oral daily  metoprolol succinate ER 25 milliGRAM(s) Oral daily  oseltamivir 75 milliGRAM(s) Oral two times a day  pantoprazole    Tablet 40 milliGRAM(s) Oral before breakfast  rosuvastatin 40 milliGRAM(s) Oral at bedtime  tamsulosin 0.4 milliGRAM(s) Oral at bedtime    MEDICATIONS  (PRN):  acetaminophen     Tablet .. 650 milliGRAM(s) Oral every 6 hours PRN Temp greater or equal to 38C (100.4F), Mild Pain (1 - 3)  benzonatate 100 milliGRAM(s) Oral three times a day PRN Cough      ALLERGIES:  Allergies    penicillin (Rash)    Intolerances        OBJECTIVE:  ICU Vital Signs Last 24 Hrs  T(C): 37.1 (29 Dec 2024 15:00), Max: 37.1 (29 Dec 2024 15:00)  T(F): 98.7 (29 Dec 2024 15:00), Max: 98.7 (29 Dec 2024 15:00)  HR: 71 (29 Dec 2024 18:07) (63 - 74)  BP: 119/55 (29 Dec 2024 18:07) (94/50 - 144/61)  BP(mean): 79 (29 Dec 2024 18:07) (67 - 86)  ABP: --  ABP(mean): --  RR: 20 (29 Dec 2024 18:07) (18 - 25)  SpO2: 96% (29 Dec 2024 18:07) (93% - 99%)    O2 Parameters below as of 29 Dec 2024 18:07  Patient On (Oxygen Delivery Method): room air            Adult Advanced Hemodynamics Last 24 Hrs  CVP(mm Hg): --  CVP(cm H2O): --  CO: --  CI: --  PA: --  PA(mean): --  PCWP: --  SVR: --  SVRI: --  PVR: --  PVRI: --  CAPILLARY BLOOD GLUCOSE      POCT Blood Glucose.: 119 mg/dL (29 Dec 2024 18:27)  POCT Blood Glucose.: 208 mg/dL (29 Dec 2024 11:07)  POCT Blood Glucose.: 312 mg/dL (29 Dec 2024 08:10)  POCT Blood Glucose.: 185 mg/dL (28 Dec 2024 21:45)    CAPILLARY BLOOD GLUCOSE      POCT Blood Glucose.: 119 mg/dL (29 Dec 2024 18:27)    I&O's Summary    28 Dec 2024 07:01  -  29 Dec 2024 07:00  --------------------------------------------------------  IN: 240 mL / OUT: 0 mL / NET: 240 mL    29 Dec 2024 07:01  -  29 Dec 2024 19:30  --------------------------------------------------------  IN: 360 mL / OUT: 700 mL / NET: -340 mL      Daily     Daily     PHYSICAL EXAMINATION:  General: WN/WD NAD  HEENT: PERRLA, EOMI, moist mucous membranes  Neurology: A&Ox3, nonfocal, MAGANA x 4  Respiratory: CTA B/L, normal respiratory effort, no wheezes, crackles, rales  CV: RRR, S1S2, no murmurs, rubs or gallops  Abdominal: Soft, NT, ND +BS, Last BM  Extremities: No edema, + peripheral pulses  Incisions:   Tubes:    LABS:                          11.4   5.95  )-----------( 157      ( 29 Dec 2024 06:15 )             35.2     12-29    131[L]  |  95[L]  |  32[H]  ----------------------------<  302[H]  3.9   |  24  |  1.07    Ca    8.4      29 Dec 2024 06:15                Urinalysis Basic - ( 29 Dec 2024 06:15 )    Color: x / Appearance: x / SG: x / pH: x  Gluc: 302 mg/dL / Ketone: x  / Bili: x / Urobili: x   Blood: x / Protein: x / Nitrite: x   Leuk Esterase: x / RBC: x / WBC x   Sq Epi: x / Non Sq Epi: x / Bacteria: x        TELEMETRY:     EKG:     IMAGING:       PATIENT:  MARTITA ALONSO  [id number]    CHIEF COMPLAINT:  Patient is a 64y old male who presents with a chief complaint of CP (29 Dec 2024 10:12)    INTERVAL HISTORY:  - s/p LHC w/ DREAD x1 LM    MEDICATIONS:  MEDICATIONS  (STANDING):  aspirin  chewable 81 milliGRAM(s) Oral daily  chlorhexidine 2% Cloths 1 Application(s) Topical <User Schedule>  dextrose 5%. 1000 milliLiter(s) (100 mL/Hr) IV Continuous <Continuous>  dextrose 5%. 1000 milliLiter(s) (50 mL/Hr) IV Continuous <Continuous>  glucagon  Injectable 1 milliGRAM(s) IntraMuscular once  guaiFENesin  milliGRAM(s) Oral every 12 hours  heparin   Injectable 5000 Unit(s) SubCutaneous every 8 hours  influenza   Vaccine 0.5 milliLiter(s) IntraMuscular once  insulin glargine Injectable (LANTUS) 34 Unit(s) SubCutaneous every morning  insulin lispro (ADMELOG) corrective regimen sliding scale   SubCutaneous three times a day before meals  insulin lispro (ADMELOG) corrective regimen sliding scale   SubCutaneous at bedtime  insulin lispro Injectable (ADMELOG) 15 Unit(s) SubCutaneous three times a day before meals  lisinopril 20 milliGRAM(s) Oral daily  metoprolol succinate ER 25 milliGRAM(s) Oral daily  oseltamivir 75 milliGRAM(s) Oral two times a day  pantoprazole    Tablet 40 milliGRAM(s) Oral before breakfast  rosuvastatin 40 milliGRAM(s) Oral at bedtime  tamsulosin 0.4 milliGRAM(s) Oral at bedtime    MEDICATIONS  (PRN):  acetaminophen     Tablet .. 650 milliGRAM(s) Oral every 6 hours PRN Temp greater or equal to 38C (100.4F), Mild Pain (1 - 3)  benzonatate 100 milliGRAM(s) Oral three times a day PRN Cough    ALLERGIES:  penicillin (Rash)    OBJECTIVE:  ICU Vital Signs Last 24 Hrs  T(C): 37.1 (29 Dec 2024 15:00), Max: 37.1 (29 Dec 2024 15:00)  T(F): 98.7 (29 Dec 2024 15:00), Max: 98.7 (29 Dec 2024 15:00)  HR: 71 (29 Dec 2024 18:07) (63 - 74)  BP: 119/55 (29 Dec 2024 18:07) (94/50 - 144/61)  BP(mean): 79 (29 Dec 2024 18:07) (67 - 86)  ABP: --  ABP(mean): --  RR: 20 (29 Dec 2024 18:07) (18 - 25)  SpO2: 96% (29 Dec 2024 18:07) (93% - 99%)    O2 Parameters below as of 29 Dec 2024 18:07  Patient On (Oxygen Delivery Method): room air    CAPILLARY BLOOD GLUCOSE  POCT Blood Glucose.: 119 mg/dL (29 Dec 2024 18:27)  POCT Blood Glucose.: 208 mg/dL (29 Dec 2024 11:07)  POCT Blood Glucose.: 312 mg/dL (29 Dec 2024 08:10)  POCT Blood Glucose.: 185 mg/dL (28 Dec 2024 21:45)    I&O's Summary    28 Dec 2024 07:01  -  29 Dec 2024 07:00  --------------------------------------------------------  IN: 240 mL / OUT: 0 mL / NET: 240 mL    29 Dec 2024 07:01  -  29 Dec 2024 19:30  --------------------------------------------------------  IN: 360 mL / OUT: 700 mL / NET: -340 mL    LABS:                          11.4   5.95  )-----------( 157      ( 29 Dec 2024 06:15 )             35.2     12-29    131[L]  |  95[L]  |  32[H]  ----------------------------<  302[H]  3.9   |  24  |  1.07    Ca    8.4      29 Dec 2024 06:15

## 2024-12-29 NOTE — PROGRESS NOTE ADULT - PROBLEM SELECTOR PLAN 2
cough w/ yellow-colored sputum x weeks not improved w/ PO abx outpatient (cefdinir, azithromycin), reporting chills, w/o fever or leukocytosis here, w/o respiratory distress     - Influenza positive --> Tamiflu  - ID following, low c/f bacterial PNA  - Can send strep & legionella
cough w/ yellow-colored sputum x weeks not improved w/ PO abx outpatient (cefdinir, azithromycin), reporting chills, w/o fever or leukocytosis here, w/o respiratory distress     - Influenza positive --> Tamiflu (5 days)  - ID following, low c/f bacterial PNA  - Can send strep & legionella  - CT Chest without signs of bacterial PNA

## 2024-12-30 ENCOUNTER — TRANSCRIPTION ENCOUNTER (OUTPATIENT)
Age: 64
End: 2024-12-30

## 2024-12-30 VITALS
DIASTOLIC BLOOD PRESSURE: 53 MMHG | HEART RATE: 72 BPM | TEMPERATURE: 98 F | RESPIRATION RATE: 16 BRPM | SYSTOLIC BLOOD PRESSURE: 108 MMHG | OXYGEN SATURATION: 100 %

## 2024-12-30 PROBLEM — K21.9 GASTRO-ESOPHAGEAL REFLUX DISEASE WITHOUT ESOPHAGITIS: Chronic | Status: ACTIVE | Noted: 2024-12-27

## 2024-12-30 LAB
ALBUMIN SERPL ELPH-MCNC: 3.4 G/DL — SIGNIFICANT CHANGE UP (ref 3.3–5)
ALP SERPL-CCNC: 112 U/L — SIGNIFICANT CHANGE UP (ref 40–120)
ALT FLD-CCNC: 18 U/L — SIGNIFICANT CHANGE UP (ref 10–45)
ANION GAP SERPL CALC-SCNC: 10 MMOL/L — SIGNIFICANT CHANGE UP (ref 5–17)
APTT BLD: 29.3 SEC — SIGNIFICANT CHANGE UP (ref 24.5–35.6)
AST SERPL-CCNC: 18 U/L — SIGNIFICANT CHANGE UP (ref 10–40)
BILIRUB SERPL-MCNC: 0.3 MG/DL — SIGNIFICANT CHANGE UP (ref 0.2–1.2)
BUN SERPL-MCNC: 28 MG/DL — HIGH (ref 7–23)
CALCIUM SERPL-MCNC: 8.3 MG/DL — LOW (ref 8.4–10.5)
CHLORIDE SERPL-SCNC: 98 MMOL/L — SIGNIFICANT CHANGE UP (ref 96–108)
CO2 SERPL-SCNC: 22 MMOL/L — SIGNIFICANT CHANGE UP (ref 22–31)
CREAT SERPL-MCNC: 0.89 MG/DL — SIGNIFICANT CHANGE UP (ref 0.5–1.3)
EGFR: 96 ML/MIN/1.73M2 — SIGNIFICANT CHANGE UP
GLUCOSE BLDC GLUCOMTR-MCNC: 265 MG/DL — HIGH (ref 70–99)
GLUCOSE SERPL-MCNC: 296 MG/DL — HIGH (ref 70–99)
HCT VFR BLD CALC: 36.6 % — LOW (ref 39–50)
HGB BLD-MCNC: 11.8 G/DL — LOW (ref 13–17)
INR BLD: 1.01 RATIO — SIGNIFICANT CHANGE UP (ref 0.85–1.16)
LEGIONELLA AG UR QL: NEGATIVE — SIGNIFICANT CHANGE UP
MAGNESIUM SERPL-MCNC: 2.3 MG/DL — SIGNIFICANT CHANGE UP (ref 1.6–2.6)
MCHC RBC-ENTMCNC: 28.1 PG — SIGNIFICANT CHANGE UP (ref 27–34)
MCHC RBC-ENTMCNC: 32.2 G/DL — SIGNIFICANT CHANGE UP (ref 32–36)
MCV RBC AUTO: 87.1 FL — SIGNIFICANT CHANGE UP (ref 80–100)
NRBC # BLD: 0 /100 WBCS — SIGNIFICANT CHANGE UP (ref 0–0)
PHOSPHATE SERPL-MCNC: 2.7 MG/DL — SIGNIFICANT CHANGE UP (ref 2.5–4.5)
PLATELET # BLD AUTO: 199 K/UL — SIGNIFICANT CHANGE UP (ref 150–400)
POTASSIUM SERPL-MCNC: 4.3 MMOL/L — SIGNIFICANT CHANGE UP (ref 3.5–5.3)
POTASSIUM SERPL-SCNC: 4.3 MMOL/L — SIGNIFICANT CHANGE UP (ref 3.5–5.3)
PROT SERPL-MCNC: 6.9 G/DL — SIGNIFICANT CHANGE UP (ref 6–8.3)
PROTHROM AB SERPL-ACNC: 11.6 SEC — SIGNIFICANT CHANGE UP (ref 9.9–13.4)
RBC # BLD: 4.2 M/UL — SIGNIFICANT CHANGE UP (ref 4.2–5.8)
RBC # FLD: 15.3 % — HIGH (ref 10.3–14.5)
S PNEUM AG UR QL: NEGATIVE — SIGNIFICANT CHANGE UP
SODIUM SERPL-SCNC: 130 MMOL/L — LOW (ref 135–145)
WBC # BLD: 6.94 K/UL — SIGNIFICANT CHANGE UP (ref 3.8–10.5)
WBC # FLD AUTO: 6.94 K/UL — SIGNIFICANT CHANGE UP (ref 3.8–10.5)

## 2024-12-30 PROCEDURE — 82607 VITAMIN B-12: CPT

## 2024-12-30 PROCEDURE — 99285 EMERGENCY DEPT VISIT HI MDM: CPT

## 2024-12-30 PROCEDURE — 80048 BASIC METABOLIC PNL TOTAL CA: CPT

## 2024-12-30 PROCEDURE — 71250 CT THORAX DX C-: CPT | Mod: MC

## 2024-12-30 PROCEDURE — 84484 ASSAY OF TROPONIN QUANT: CPT

## 2024-12-30 PROCEDURE — 86850 RBC ANTIBODY SCREEN: CPT

## 2024-12-30 PROCEDURE — 87449 NOS EACH ORGANISM AG IA: CPT

## 2024-12-30 PROCEDURE — 93010 ELECTROCARDIOGRAM REPORT: CPT

## 2024-12-30 PROCEDURE — 85025 COMPLETE CBC W/AUTO DIFF WBC: CPT

## 2024-12-30 PROCEDURE — 87637 SARSCOV2&INF A&B&RSV AMP PRB: CPT

## 2024-12-30 PROCEDURE — 80053 COMPREHEN METABOLIC PANEL: CPT

## 2024-12-30 PROCEDURE — 85730 THROMBOPLASTIN TIME PARTIAL: CPT

## 2024-12-30 PROCEDURE — 83550 IRON BINDING TEST: CPT

## 2024-12-30 PROCEDURE — 85520 HEPARIN ASSAY: CPT

## 2024-12-30 PROCEDURE — 82553 CREATINE MB FRACTION: CPT

## 2024-12-30 PROCEDURE — 82746 ASSAY OF FOLIC ACID SERUM: CPT

## 2024-12-30 PROCEDURE — 93454 CORONARY ARTERY ANGIO S&I: CPT | Mod: 59

## 2024-12-30 PROCEDURE — 85027 COMPLETE CBC AUTOMATED: CPT

## 2024-12-30 PROCEDURE — 87040 BLOOD CULTURE FOR BACTERIA: CPT

## 2024-12-30 PROCEDURE — C1769: CPT

## 2024-12-30 PROCEDURE — 82550 ASSAY OF CK (CPK): CPT

## 2024-12-30 PROCEDURE — 93970 EXTREMITY STUDY: CPT

## 2024-12-30 PROCEDURE — 85045 AUTOMATED RETICULOCYTE COUNT: CPT

## 2024-12-30 PROCEDURE — 82962 GLUCOSE BLOOD TEST: CPT

## 2024-12-30 PROCEDURE — C1887: CPT

## 2024-12-30 PROCEDURE — 87899 AGENT NOS ASSAY W/OPTIC: CPT

## 2024-12-30 PROCEDURE — 99232 SBSQ HOSP IP/OBS MODERATE 35: CPT

## 2024-12-30 PROCEDURE — 83540 ASSAY OF IRON: CPT

## 2024-12-30 PROCEDURE — 71045 X-RAY EXAM CHEST 1 VIEW: CPT

## 2024-12-30 PROCEDURE — 84466 ASSAY OF TRANSFERRIN: CPT

## 2024-12-30 PROCEDURE — 83880 ASSAY OF NATRIURETIC PEPTIDE: CPT

## 2024-12-30 PROCEDURE — 93005 ELECTROCARDIOGRAM TRACING: CPT

## 2024-12-30 PROCEDURE — 83036 HEMOGLOBIN GLYCOSYLATED A1C: CPT

## 2024-12-30 PROCEDURE — C1874: CPT

## 2024-12-30 PROCEDURE — 84145 PROCALCITONIN (PCT): CPT

## 2024-12-30 PROCEDURE — 84100 ASSAY OF PHOSPHORUS: CPT

## 2024-12-30 PROCEDURE — C8929: CPT

## 2024-12-30 PROCEDURE — C1725: CPT

## 2024-12-30 PROCEDURE — 99239 HOSP IP/OBS DSCHRG MGMT >30: CPT

## 2024-12-30 PROCEDURE — 82728 ASSAY OF FERRITIN: CPT

## 2024-12-30 PROCEDURE — C9600: CPT | Mod: LM

## 2024-12-30 PROCEDURE — 85379 FIBRIN DEGRADATION QUANT: CPT

## 2024-12-30 PROCEDURE — 85610 PROTHROMBIN TIME: CPT

## 2024-12-30 PROCEDURE — C1894: CPT

## 2024-12-30 PROCEDURE — 86901 BLOOD TYPING SEROLOGIC RH(D): CPT

## 2024-12-30 PROCEDURE — 86900 BLOOD TYPING SEROLOGIC ABO: CPT

## 2024-12-30 PROCEDURE — 83735 ASSAY OF MAGNESIUM: CPT

## 2024-12-30 RX ORDER — OSELTAMIVIR 75 MG/1
1 CAPSULE ORAL
Qty: 4 | Refills: 0
Start: 2024-12-30 | End: 2024-12-31

## 2024-12-30 RX ORDER — INSULIN GLARGINE-YFGN 100 [IU]/ML
45 INJECTION, SOLUTION SUBCUTANEOUS EVERY MORNING
Refills: 0 | Status: DISCONTINUED | OUTPATIENT
Start: 2024-12-30 | End: 2024-12-30

## 2024-12-30 RX ORDER — OSELTAMIVIR 75 MG/1
1 CAPSULE ORAL
Qty: 0 | Refills: 0 | DISCHARGE
Start: 2024-12-30

## 2024-12-30 RX ORDER — METOPROLOL TARTRATE 50 MG
1 TABLET ORAL
Qty: 90 | Refills: 3
Start: 2024-12-30

## 2024-12-30 RX ORDER — CLOPIDOGREL BISULFATE 75 MG/1
1 TABLET, FILM COATED ORAL
Qty: 90 | Refills: 3
Start: 2024-12-30

## 2024-12-30 RX ORDER — ASPIRIN 81 MG
1 TABLET, DELAYED RELEASE (ENTERIC COATED) ORAL
Qty: 90 | Refills: 3
Start: 2024-12-30

## 2024-12-30 RX ORDER — CLOPIDOGREL BISULFATE 75 MG/1
1 TABLET, FILM COATED ORAL
Qty: 0 | Refills: 0 | DISCHARGE
Start: 2024-12-30

## 2024-12-30 RX ORDER — METOPROLOL TARTRATE 50 MG
1 TABLET ORAL
Qty: 0 | Refills: 0 | DISCHARGE
Start: 2024-12-30

## 2024-12-30 RX ORDER — ASPIRIN 81 MG
1 TABLET, DELAYED RELEASE (ENTERIC COATED) ORAL
Refills: 0 | DISCHARGE

## 2024-12-30 RX ADMIN — CLOPIDOGREL BISULFATE 75 MILLIGRAM(S): 75 TABLET, FILM COATED ORAL at 11:46

## 2024-12-30 RX ADMIN — HEPARIN SODIUM 5000 UNIT(S): 1000 INJECTION, SOLUTION INTRAVENOUS; SUBCUTANEOUS at 06:54

## 2024-12-30 RX ADMIN — OSELTAMIVIR 75 MILLIGRAM(S): 75 CAPSULE ORAL at 06:54

## 2024-12-30 RX ADMIN — CHLORHEXIDINE GLUCONATE 1 APPLICATION(S): 1.2 RINSE ORAL at 06:55

## 2024-12-30 RX ADMIN — PANTOPRAZOLE 40 MILLIGRAM(S): 40 TABLET, DELAYED RELEASE ORAL at 06:54

## 2024-12-30 RX ADMIN — INSULIN GLARGINE-YFGN 45 UNIT(S): 100 INJECTION, SOLUTION SUBCUTANEOUS at 09:31

## 2024-12-30 RX ADMIN — Medication 600 MILLIGRAM(S): at 06:54

## 2024-12-30 RX ADMIN — Medication 15 UNIT(S): at 08:03

## 2024-12-30 RX ADMIN — Medication 6: at 08:03

## 2024-12-30 RX ADMIN — LISINOPRIL 20 MILLIGRAM(S): 30 TABLET ORAL at 06:54

## 2024-12-30 RX ADMIN — Medication 81 MILLIGRAM(S): at 11:46

## 2024-12-30 RX ADMIN — Medication 25 MILLIGRAM(S): at 06:54

## 2024-12-30 NOTE — PROGRESS NOTE ADULT - ASSESSMENT
64y male PMH  former 30 pack-year smoker quit 2001, PMH T2DM, HTN, HLD, GERD presented with chest pain, MANCILLA and cough underwent an abnormal outpatient stress test, Influenza A positive upon admission s/p [facility] DREAD x1 to ostial LM 90%via RFA 12/29    - R groin site stable- no hematoma- site care discussed with patient and instructions given  - Continue DAPT, aspirin 81mg and clopidogrel 75mg x 1 year  - Influenza A positive, on [place]  - Continue rosuvastatin 40mg daily  - TTE EF 67%, no regional wall motion abnormalities  - Medication adherence stressed to patient with discussion of risks of non-compliance including stent thrombosis  - Recommend a heart healthy diet which includes a variety of fruits and vegetables, whole grains, low fat dairy products, legumes and skinless poultry and fish; food prepared with little or no salt and minimize processed foods  - Avoid using NSAIDs (Aleve, Motrin, ibuprofen, naproxen) while on DAPT, please utilize Tylenol for pain control (not to exceed 4gm in 24 hours)  - continue telemetry  - DM2- f/u endocrine recs for glucose control  - cardiac rehab referral provided to patient, instructed to follow up with outpatient cardiologist  - Appreciate CICU care  - outpatient follow up with Dr. Frederic Mariee in 2 weeks upon discharge from the hospital

## 2024-12-30 NOTE — PROGRESS NOTE ADULT - SUBJECTIVE AND OBJECTIVE BOX
MARTITA ALONSO  MRN-[id number]  Patient is a 64y old  Male who presents with a chief complaint of CP (29 Dec 2024 19:30)    HPI:  63M former 30 pack-year smoker, quit 2001, PMH T2DM, HTN, HLD, GERD p/w CP, MANCILLA, cough. Reports exertional CP when ascending incline surfaces x years, however x 2 weeks has experienced chest tightness/pressure at rest w/o radiation, in addition to dyspnea on minimal exertion. This occurs in context of cough with yellow-colored sputum production of similar duration accompanied by chills, for which he reportedly was evaluated by urgent care and prescribed azithromycin, followed by cefdinir, w/o sx abatement. Visited cardiology Dr. Mariee for sx, underwent exercise stress test (also noting elevated calcium score previously with abnormal result) and was referred to ED for evaluation. Admitted to medicine. Found to be Flu A+. Cardiology consulted and pt underwent coronary CTA, which was concerning for CAD, so pt sent for [facility] where he received 1 DREAD to . Transferred to CICU from cath lab for further management.     24 HOUR EVENTS: No acute events overnight.     REVIEW OF SYSTEMS:    CONSTITUTIONAL: No weakness, fevers or chills  EYES/ENT: No visual changes;  No vertigo or throat pain   NECK: No pain or stiffness  RESPIRATORY: No cough, wheezing, hemoptysis; No shortness of breath  CARDIOVASCULAR: No chest pain or palpitations  GASTROINTESTINAL: No abdominal or epigastric pain. No nausea, vomiting, or hematemesis; No diarrhea or constipation. No melena or hematochezia.  GENITOURINARY: No dysuria, frequency or hematuria  NEUROLOGICAL: No numbness or weakness  SKIN: No itching, rashes    ICU Vital Signs Last 24 Hrs  T(C): 37 (29 Dec 2024 23:00), Max: 37.1 (29 Dec 2024 15:00)  T(F): 98.6 (29 Dec 2024 23:00), Max: 98.7 (29 Dec 2024 15:00)  HR: 74 (30 Dec 2024 06:00) (63 - 81)  BP: 110/61 (30 Dec 2024 06:00) (94/50 - 144/61)  BP(mean): 78 (30 Dec 2024 06:00) (67 - 91)  RR: 19 (30 Dec 2024 06:00) (17 - 25)  SpO2: 96% (30 Dec 2024 06:00) (93% - 99%)    O2 Parameters below as of 30 Dec 2024 06:00  Patient On (Oxygen Delivery Method): room air    I&O's Summary    29 Dec 2024 07:01  -  30 Dec 2024 07:00  --------------------------------------------------------  IN: 600 mL / OUT: 1700 mL / NET: -1100 mL    CAPILLARY BLOOD GLUCOSE    POCT Blood Glucose.: 273 mg/dL (29 Dec 2024 21:49)    PHYSICAL EXAM:  GENERAL: No acute distress, well-developed  HEAD:  Atraumatic, Normocephalic  EYES: EOMI, PERRLA, conjunctiva and sclera clear  NECK: Supple, no lymphadenopathy, no JVD  CHEST/LUNG: CTAB; No wheezes, rales, or rhonchi  HEART: Regular rate and rhythm. Normal S1/S2. No murmurs, rubs, or gallops  ABDOMEN: Soft, non-tender, non-distended; normal bowel sounds, no organomegaly  EXTREMITIES:  2+ peripheral pulses b/l, No clubbing, cyanosis, or edema  NEUROLOGY: A&O x 3, no focal deficits  SKIN: No rashes or lesions    ============================I/O===========================   I&O's Detail    29 Dec 2024 07:01  -  30 Dec 2024 07:00  --------------------------------------------------------  IN:    Oral Fluid: 600 mL  Total IN: 600 mL    OUT:    Voided (mL): 1700 mL  Total OUT: 1700 mL    Total NET: -1100 mL    ============================ LABS =========================                        11.8   6.94  )-----------( 199      ( 30 Dec 2024 00:45 )             36.6     12-30    130[L]  |  98  |  28[H]  ----------------------------<  296[H]  4.3   |  22  |  0.89    Ca    8.3[L]      30 Dec 2024 00:45  Phos  2.7     12-30  Mg     2.3     12-30    TPro  6.9  /  Alb  3.4  /  TBili  0.3  /  DBili  x   /  AST  18  /  ALT  18  /  AlkPhos  112  12-30    LIVER FUNCTIONS - ( 30 Dec 2024 00:45 )  Alb: 3.4 g/dL / Pro: 6.9 g/dL / ALK PHOS: 112 U/L / ALT: 18 U/L / AST: 18 U/L / GGT: x           PT/INR - ( 30 Dec 2024 00:45 )   PT: 11.6 sec;   INR: 1.01 ratio       PTT - ( 30 Dec 2024 00:45 )  PTT:29.3 sec    Urinalysis Basic - ( 30 Dec 2024 00:45 )    Color: x / Appearance: x / SG: x / pH: x  Gluc: 296 mg/dL / Ketone: x  / Bili: x / Urobili: x   Blood: x / Protein: x / Nitrite: x   Leuk Esterase: x / RBC: x / WBC x   Sq Epi: x / Non Sq Epi: x / Bacteria: x    ======================Micro/Rad/Cardio=================  Telemetry: Reviewed   EKG: Reviewed  Culture: Reviewed   Cath: Reviewed   ======================================================  PAST MEDICAL & SURGICAL HISTORY:  DM (diabetes mellitus)    HLD (hyperlipidemia)    GERD (gastroesophageal reflux disease)    H/O elbow surgery    H/O shoulder surgery MARTITA ALONSO  [id number]  Patient is a 64y old  Male who presents with a chief complaint of CP (29 Dec 2024 19:30)    HPI:  63M former 30 pack-year smoker, quit 2001, PMH T2DM, HTN, HLD, GERD p/w CP, MANCILLA, cough. Reports exertional CP when ascending incline surfaces x years, however x 2 weeks has experienced chest tightness/pressure at rest w/o radiation, in addition to dyspnea on minimal exertion. This occurs in context of cough with yellow-colored sputum production of similar duration accompanied by chills, for which he reportedly was evaluated by urgent care and prescribed azithromycin, followed by cefdinir, w/o sx abatement. Visited cardiology Dr. Mariee for sx, underwent exercise stress test (also noting elevated calcium score previously with abnormal result) and was referred to ED for evaluation. Admitted to medicine. Found to be Flu A+. Cardiology consulted and pt underwent coronary CTA, which was concerning for CAD, so pt sent for [facility] where he received 1 DREAD to . Transferred to CICU from cath lab for further management.     24 HOUR EVENTS: No acute events overnight. This AM pt complaining of cough and which kept him from sleeping well last night. Wants to go home today.     REVIEW OF SYSTEMS:    CONSTITUTIONAL: No weakness, fevers or chills  EYES/ENT: No visual changes;  No vertigo or throat pain   NECK: No pain or stiffness  RESPIRATORY: No cough, wheezing, hemoptysis; No shortness of breath  CARDIOVASCULAR: No chest pain or palpitations  GASTROINTESTINAL: No abdominal or epigastric pain. No nausea, vomiting, or hematemesis; No diarrhea or constipation. No melena or hematochezia.  GENITOURINARY: No dysuria, frequency or hematuria  NEUROLOGICAL: No numbness or weakness  SKIN: No itching, rashes    ICU Vital Signs Last 24 Hrs  T(C): 37 (29 Dec 2024 23:00), Max: 37.1 (29 Dec 2024 15:00)  T(F): 98.6 (29 Dec 2024 23:00), Max: 98.7 (29 Dec 2024 15:00)  HR: 74 (30 Dec 2024 06:00) (63 - 81)  BP: 110/61 (30 Dec 2024 06:00) (94/50 - 144/61)  BP(mean): 78 (30 Dec 2024 06:00) (67 - 91)  RR: 19 (30 Dec 2024 06:00) (17 - 25)  SpO2: 96% (30 Dec 2024 06:00) (93% - 99%)    O2 Parameters below as of 30 Dec 2024 06:00  Patient On (Oxygen Delivery Method): room air    I&O's Summary    29 Dec 2024 07:01  -  30 Dec 2024 07:00  --------------------------------------------------------  IN: 600 mL / OUT: 1700 mL / NET: -1100 mL    CAPILLARY BLOOD GLUCOSE    POCT Blood Glucose.: 273 mg/dL (29 Dec 2024 21:49)    PHYSICAL EXAM:  GENERAL: No acute distress, well-developed  HEAD:  Atraumatic, Normocephalic  EYES: EOMI, PERRLA, conjunctiva and sclera clear  NECK: Supple, no lymphadenopathy, no JVD  CHEST/LUNG: CTAB; No wheezes, rales, or rhonchi  HEART: Regular rate and rhythm. Normal S1/S2. No murmurs, rubs, or gallops  ABDOMEN: Soft, non-tender, non-distended; normal bowel sounds, no organomegaly  EXTREMITIES:  2+ peripheral pulses b/l, No clubbing, cyanosis, or edema  NEUROLOGY: A&O x 3, no focal deficits  SKIN: No rashes or lesions    ============================I/O===========================   I&O's Detail    29 Dec 2024 07:01  -  30 Dec 2024 07:00  --------------------------------------------------------  IN:    Oral Fluid: 600 mL  Total IN: 600 mL    OUT:    Voided (mL): 1700 mL  Total OUT: 1700 mL    Total NET: -1100 mL    ============================ LABS =========================                        11.8   6.94  )-----------( 199      ( 30 Dec 2024 00:45 )             36.6     12-30    130[L]  |  98  |  28[H]  ----------------------------<  296[H]  4.3   |  22  |  0.89    Ca    8.3[L]      30 Dec 2024 00:45  Phos  2.7     12-30  Mg     2.3     12-30    TPro  6.9  /  Alb  3.4  /  TBili  0.3  /  DBili  x   /  AST  18  /  ALT  18  /  AlkPhos  112  12-30    LIVER FUNCTIONS - ( 30 Dec 2024 00:45 )  Alb: 3.4 g/dL / Pro: 6.9 g/dL / ALK PHOS: 112 U/L / ALT: 18 U/L / AST: 18 U/L / GGT: x           PT/INR - ( 30 Dec 2024 00:45 )   PT: 11.6 sec;   INR: 1.01 ratio       PTT - ( 30 Dec 2024 00:45 )  PTT:29.3 sec    Urinalysis Basic - ( 30 Dec 2024 00:45 )    Color: x / Appearance: x / SG: x / pH: x  Gluc: 296 mg/dL / Ketone: x  / Bili: x / Urobili: x   Blood: x / Protein: x / Nitrite: x   Leuk Esterase: x / RBC: x / WBC x   Sq Epi: x / Non Sq Epi: x / Bacteria: x    ======================Micro/Rad/Cardio=================  Telemetry: Reviewed   EKG: Reviewed  Culture: Reviewed   Cath: Reviewed   ======================================================  PAST MEDICAL & SURGICAL HISTORY:  DM (diabetes mellitus)    HLD (hyperlipidemia)    GERD (gastroesophageal reflux disease)    H/O elbow surgery    H/O shoulder surgery

## 2024-12-30 NOTE — PROGRESS NOTE ADULT - ASSESSMENT
====================ASSESSMENT ======================  63M former 30 pack-year smoker, quit 2001, PMH T2DM, HTN, HLD, GERD p/w CP, MANCILLA, cough, referred by cardiology Dr. Mariee given abn outpatient stress test. Now s/p DREAD x1 to ostial LM 90%.     Plan:  ====================== NEUROLOGY=====================  - No active issues; A&Ox4  - Neuro checks per CICU protocol    ==================== RESPIRATORY======================  # Flu A+ on admission  - Tamiflu x5 days (12/27-  - CT Chest without signs of bacterial PNA  - SpO2 > 92% on RA  - Guaifenesin, tessalon PRN    ====================CARDIOVASCULAR==================  # CAD   # Abnormal stress test    # Chest pain  - Sent from cardiologist office to ED for abnormal stress test and p/w chest pain  - Coronary CTA c/f CAD   - LHC 12/29: DREAD x1 to ostial LM 90%  - TTE 12/27: EF 67  - GDMT  - c/w ASA, plavix, statin  - c/w BB, ACEi  - c/w statin    # HLD  # HTN  - As above    ===================== RENAL =========================  - No active issues  - Monitor SCr, UOP  - Monitor electrolytes and replete for goal K 4-4.5 and Mg >2     # BPH  - c/w tamsulosin     ==================== GASTROINTESTINAL===================  # GERD  - c/w PPI  - c/w regular diet  - c/w bowel regimen    =======================    ENDOCRINE  =====================  # DMT2, insulin dependent  - Home reg: tresiba 60u BID with novolog  - c/w ISS, lantus  - A1C 8%  - Endo following    ===================HEMATOLOGIC/ONC ===================  - No active issues  - VA Duplex LE 12/27 negative for DVT  - Trend CBC and transfuse for Hgb <7, plt <10k, <20k and febrile, or <50k and bleeding/pending invasive procedure    # VTE ppx  - HSQ  ========================INFECTIOUS DISEASE================  # Flu  -  Flu A+ on admission  - Tamiflu x5 days (12/27-12/31)  - CT Chest without signs of bacterial PNA  - No leukocytosis, afebrile  - Blood Cx - NGTD  - Step antigen negative  - ID following    ======================= LINES/TUBES  =====================  PIVs    Patient requires continuous monitoring with bedside rhythm monitoring, pulse ox monitoring, and intermittent blood gas analysis. Care plan discussed with ICU care team. Patient remained critical and at risk for life threatening decompensation.  Patient seen, examined and plan discussed with CCU team and Dr. Crespo during rounds.     ASHLEY Diaz-BC   ACP Fellow      ====================ASSESSMENT ======================  63M former 30 pack-year smoker, quit 2001, PMH T2DM, HTN, HLD, GERD p/w CP, MANCILLA, cough, referred by cardiology Dr. Mariee given abn outpatient stress test. Now s/p DREAD x1 to ostial LM 90%.     Plan:  ====================== NEUROLOGY=====================  - No active issues; A&Ox4  - Neuro checks per CICU protocol    ==================== RESPIRATORY======================  # Flu A+ on admission  - Tamiflu x5 days (12/27-  - CT Chest without signs of bacterial PNA  - SpO2 > 92% on RA  - Guaifenesin, tessalon PRN    ====================CARDIOVASCULAR==================  # CAD   # Abnormal stress test    # Chest pain  - Sent from cardiologist office to ED for abnormal stress test and p/w chest pain  - Coronary CTA c/f CAD   - LHC 12/29: DREAD x1 to ostial LM 90%  - TTE 12/27: EF 67  - GDMT:  - c/w ASA, plavix, statin  - c/w BB, ACEi    # HLD  # HTN  - As above    ===================== RENAL =========================  - No active issues  - Monitor SCr, UOP  - Monitor electrolytes and replete for goal K 4-4.5 and Mg >2     # BPH  - c/w tamsulosin     ==================== GASTROINTESTINAL===================  # GERD  - c/w PPI  - c/w regular diet  - c/w bowel regimen    =======================    ENDOCRINE  =====================  # DMT2, insulin dependent  - Home reg: tresiba 60u BID with novolog  - c/w ISS, lantus  - A1C 8%  - Endo following    ===================HEMATOLOGIC/ONC ===================  - No active issues  - VA Duplex LE 12/27 negative for DVT  - Trend CBC and transfuse for Hgb <7, plt <10k, <20k and febrile, or <50k and bleeding/pending invasive procedure    # VTE ppx  - HSQ  ========================INFECTIOUS DISEASE================  # Flu  -  Flu A+ on admission  - Tamiflu x5 days (12/27-12/31)  - CT Chest without signs of bacterial PNA  - No leukocytosis, afebrile  - Blood Cx - NGTD  - Step antigen negative  - ID following    ======================= LINES/TUBES  =====================  PIVs    Patient requires continuous monitoring with bedside rhythm monitoring, pulse ox monitoring, and intermittent blood gas analysis. Care plan discussed with ICU care team. Patient remained critical and at risk for life threatening decompensation.  Patient seen, examined and plan discussed with CCU team and Dr. Crespo during rounds.     Jacy Harper, ASHLEY-BC   ACP Fellow      ====================ASSESSMENT ======================  63M former 30 pack-year smoker, quit 2001, PMH T2DM, HTN, HLD, GERD p/w CP, MANCILLA, cough, referred by cardiology Dr. Mariee given abn outpatient stress test. Now s/p DREAD x1 to ostial LM 90%.     Plan:  ====================== NEUROLOGY=====================  - No active issues; A&Ox4  - Neuro checks per CICU protocol    ==================== RESPIRATORY======================  # Flu A+ on admission  - Tamiflu x5 days (12/27-  - CT Chest without signs of bacterial PNA  - SpO2 > 92% on RA  - Guaifenesin, tessalon PRN    ====================CARDIOVASCULAR==================  # CAD   # Abnormal stress test    # Chest pain  - Sent from cardiologist office to ED for abnormal stress test and p/w chest pain  - Coronary CTA c/f CAD   - LHC 12/29: DREAD x1 to ostial LM 90%  - TTE 12/27: EF 67  - GDMT:  - c/w ASA, plavix, statin  - c/w BB, ACEi    # HLD  # HTN  - As above    ===================== RENAL =========================  - No active issues  - Monitor SCr, UOP  - Monitor electrolytes and replete for goal K 4-4.5 and Mg >2     # BPH  - c/w tamsulosin     ==================== GASTROINTESTINAL===================  # GERD  - c/w PPI  - c/w regular diet  - c/w bowel regimen    =======================    ENDOCRINE  =====================  # DMT2, insulin dependent  - Home reg: tresiba 60u BID with novolog  - c/w ISS, lantus  - A1C 8%  - Endo following    ===================HEMATOLOGIC/ONC ===================  - No active issues  - VA Duplex LE 12/27 negative for DVT  - Trend CBC and transfuse for Hgb <7, plt <10k, <20k and febrile, or <50k and bleeding/pending invasive procedure    # VTE ppx  - HSQ  ========================INFECTIOUS DISEASE================  # Flu  -  Flu A+ on admission  - Tamiflu x5 days (12/27-12/31)  - CT Chest without signs of bacterial PNA  - No leukocytosis, afebrile  - Blood Cx - NGTD  - Step and lefionella antigens negative  - ID following    ======================= LINES/TUBES  =====================  PIVs    Patient seen, examined and plan discussed with CCU team and Dr. Crespo during rounds.     ASHLEY Diaz-BC   ACP Fellow

## 2024-12-30 NOTE — PROGRESS NOTE ADULT - SUBJECTIVE AND OBJECTIVE BOX
Interventional Cardiology Post Cath Progress Note:                Subjective:   Patient feels well- no current complaints- Denies chest pain, shortness of breath. Denies pain, numbness, tingling or swelling around R groin access site      MEDICATIONS  (STANDING):  aspirin  chewable 81 milliGRAM(s) Oral daily  chlorhexidine 2% Cloths 1 Application(s) Topical <User Schedule>  clopidogrel Tablet 75 milliGRAM(s) Oral daily  dextrose 5%. 1000 milliLiter(s) (100 mL/Hr) IV Continuous <Continuous>  dextrose 5%. 1000 milliLiter(s) (50 mL/Hr) IV Continuous <Continuous>  glucagon  Injectable 1 milliGRAM(s) IntraMuscular once  guaiFENesin  milliGRAM(s) Oral every 12 hours  heparin   Injectable 5000 Unit(s) SubCutaneous every 8 hours  influenza   Vaccine 0.5 milliLiter(s) IntraMuscular once  insulin glargine Injectable (LANTUS) 45 Unit(s) SubCutaneous every morning  insulin lispro (ADMELOG) corrective regimen sliding scale   SubCutaneous three times a day before meals  insulin lispro (ADMELOG) corrective regimen sliding scale   SubCutaneous at bedtime  insulin lispro Injectable (ADMELOG) 15 Unit(s) SubCutaneous three times a day before meals  lisinopril 20 milliGRAM(s) Oral daily  metoprolol succinate ER 25 milliGRAM(s) Oral daily  oseltamivir 75 milliGRAM(s) Oral two times a day  pantoprazole    Tablet 40 milliGRAM(s) Oral before breakfast  rosuvastatin 40 milliGRAM(s) Oral at bedtime  tamsulosin 0.4 milliGRAM(s) Oral at bedtime    MEDICATIONS  (PRN):  acetaminophen     Tablet .. 650 milliGRAM(s) Oral every 6 hours PRN Temp greater or equal to 38C (100.4F), Mild Pain (1 - 3)  benzonatate 100 milliGRAM(s) Oral three times a day PRN Cough      Objective:  Vital Signs Last 24 Hrs  T(C): 36.9 (30 Dec 2024 07:01), Max: 37.1 (29 Dec 2024 15:00)  T(F): 98.4 (30 Dec 2024 07:01), Max: 98.7 (29 Dec 2024 15:00)  HR: 72 (30 Dec 2024 10:00) (63 - 81)  BP: 107/52 (30 Dec 2024 10:00) (94/50 - 144/61)  BP(mean): 75 (30 Dec 2024 10:00) (67 - 91)  RR: 22 (30 Dec 2024 10:00) (16 - 25)  SpO2: 100% (30 Dec 2024 10:00) (93% - 100%)    Parameters below as of 30 Dec 2024 10:00  Patient On (Oxygen Delivery Method): room air        12-29-24 @ 07:01  -  12-30-24 @ 07:00  --------------------------------------------------------  IN: 600 mL / OUT: 1700 mL / NET: -1100 mL                              11.8   6.94  )-----------( 199      ( 30 Dec 2024 00:45 )             36.6     12-30    130[L]  |  98  |  28[H]  ----------------------------<  296[H]  4.3   |  22  |  0.89    Ca    8.3[L]      30 Dec 2024 00:45  Phos  2.7     12-30  Mg     2.3     12-30    TPro  6.9  /  Alb  3.4  /  TBili  0.3  /  DBili  x   /  AST  18  /  ALT  18  /  AlkPhos  112  12-30    PT/INR - ( 30 Dec 2024 00:45 )   PT: 11.6 sec;   INR: 1.01 ratio         PTT - ( 30 Dec 2024 00:45 )  PTT:29.3 sec  Urinalysis Basic - ( 30 Dec 2024 00:45 )    Color: x / Appearance: x / SG: x / pH: x  Gluc: 296 mg/dL / Ketone: x  / Bili: x / Urobili: x   Blood: x / Protein: x / Nitrite: x   Leuk Esterase: x / RBC: x / WBC x   Sq Epi: x / Non Sq Epi: x / Bacteria: x    Cardiac Catheterization (12.29.24 @ 09:54)   Cath Lab Report    Diagnostic Cardiologist:       Haja Herrera MD   Interventional Cardiologist:   Haja Herrera MD   Fellow:                        Bi Robb MD   Referring Physician:           Frederic Mariee MD     Procedures Performed   Procedures:                 1.Arterial Access - Right Femoral   2.    Diagnostic Coronary Angiography   3.    Ultrasound Guided Access   4.    PCI: DREAD     Indications:               ACS greater than 24 hours   PCI Status:               elective     Conclusions:   Successful PCI with DREAD to the ostial LM.  DAPT for 1 year.  Syntax  score was low    Diagnostic Findings:     Coronary Angiography   The coronary circulation is co-dominant.      LM   Left main artery: There is a 90 % stenosis.      LAD   Left anterior descending artery: Angiography shows mild  atherosclerosis.    Patient: MARTITA ALONSO          MRN: [id number]    CX   Circumflex: Angiography shows no disease.      RCA   Right coronary artery: Angiography shows no disease.        TTE W or WO Ultrasound Enhancing Agent (12.27.24 @ 13:40)      CONCLUSIONS:      1. Technically difficult image quality.   2. Left ventricular systolic function is normal with an ejection fraction of 67 % by Suárez's method of disks. There are no regional wall motion abnormalities seen.   3. Normal left ventricular diastolic function.   4. Normal right ventricular cavity size and normal right ventricular systolic function.   5. No significant valvular disease.   6. No pericardial effusion seen.        Physical Exam:  No apparent distress, alert and oriented times three, appropriate affect  JVD is not elevated, supple  Clear to auscultation with no wheezing, rhonchi or crackles  Regular rate and rhythm with no murmur, rub or gallop  Soft, non-tender, non-distended, no masses/guarding or rebound tenderness  R groin: Soft, non tender, no bleeding or hematoma, clean/dry/intact- RLE/LLE +2 palpable DP/PT pulse, no clubbing, cyanosis or edema

## 2024-12-30 NOTE — PROGRESS NOTE ADULT - SUBJECTIVE AND OBJECTIVE BOX
Follow Up:    Flu A    Interval History/ROS:  VSS. S/p [facility]. Patient was seen and examined at bedside. Denies fever. +intermittent cough    Allergies  penicillin (Rash)        ANTIMICROBIALS:  oseltamivir 75 two times a day      OTHER MEDS:  MEDICATIONS  (STANDING):  acetaminophen     Tablet .. 650 every 6 hours PRN  aspirin  chewable 81 daily  benzonatate 100 three times a day PRN  clopidogrel Tablet 75 daily  glucagon  Injectable 1 once  guaiFENesin  every 12 hours  heparin   Injectable 5000 every 8 hours  influenza   Vaccine 0.5 once  insulin glargine Injectable (LANTUS) 45 every morning  insulin lispro (ADMELOG) corrective regimen sliding scale  three times a day before meals  insulin lispro (ADMELOG) corrective regimen sliding scale  at bedtime  insulin lispro Injectable (ADMELOG) 15 three times a day before meals  lisinopril 20 daily  metoprolol succinate ER 25 daily  pantoprazole    Tablet 40 before breakfast  rosuvastatin 40 at bedtime  tamsulosin 0.4 at bedtime      Vital Signs Last 24 Hrs  T(C): 36.8 (30 Dec 2024 11:00), Max: 37 (29 Dec 2024 23:00)  T(F): 98.3 (30 Dec 2024 11:00), Max: 98.6 (29 Dec 2024 23:00)  HR: 72 (30 Dec 2024 11:00) (63 - 81)  BP: 108/53 (30 Dec 2024 11:00) (99/47 - 139/63)  BP(mean): 75 (30 Dec 2024 11:00) (67 - 91)  RR: 16 (30 Dec 2024 11:00) (16 - 25)  SpO2: 100% (30 Dec 2024 11:00) (93% - 100%)    Parameters below as of 30 Dec 2024 11:00  Patient On (Oxygen Delivery Method): room air          PHYSICAL EXAM:  Constitutional: non-toxic, no distress  HEAD/EYES: anicteric, no conjunctival injection  Cardiovascular:   normal S1, S2, no murmur, RRR  Respiratory:  clear BS bilaterally  GI:  soft, non-tender  Skin:  no rash, no phlebitis  Heme/Onc: no lymphadenopathy   Psychiatric:  awake, alert, appropriate mood                                      11.8   6.94  )-----------( 199      ( 30 Dec 2024 00:45 )             36.6       12-30    130[L]  |  98  |  28[H]  ----------------------------<  296[H]  4.3   |  22  |  0.89    Ca    8.3[L]      30 Dec 2024 00:45  Phos  2.7     12-30  Mg     2.3     12-30    TPro  6.9  /  Alb  3.4  /  TBili  0.3  /  DBili  x   /  AST  18  /  ALT  18  /  AlkPhos  112  12-30      Urinalysis Basic - ( 30 Dec 2024 00:45 )    Color: x / Appearance: x / SG: x / pH: x  Gluc: 296 mg/dL / Ketone: x  / Bili: x / Urobili: x   Blood: x / Protein: x / Nitrite: x   Leuk Esterase: x / RBC: x / WBC x   Sq Epi: x / Non Sq Epi: x / Bacteria: x        MICROBIOLOGY:  v  .Blood BLOOD  12-27-24   No growth at 48 Hours  --  --                RADIOLOGY:  Imaging below independently reviewed.  < from: CT Chest No Cont (12.28.24 @ 19:34) >    ACC: [id number] EXAM:  CT CHEST   ORDERED BY:  HALLE FERNANDEZ     PROCEDURE DATE:  12/28/2024          INTERPRETATION:  CLINICAL INFORMATION: Evaluate for pneumonia. History of   cough with purulent sputum and chills for weeks.    COMPARISON: 2/12/2024 calcium score..    CONTRAST/COMPLICATIONS:  IV Contrast: NONE  Oral Contrast: NONE  .    PROCEDURE:  CT of the Chest was performed.  Sagittal and coronal reformats were performed.    FINDINGS:    LUNGS AND LARGE AIRWAYS: Patent central airways. Reticulonodular   tree-in-bud infiltrate is seen in the left lower lobe. There is   peribronchial thickening and mucous plugging in the lower lobe bronchi.   Similar focal infiltrate is seen in the right upper lobe centrally  PLEURA: No pleural effusion.  VESSELS: Within normal limits.  HEART: Heart size is normal. Scattered coronary artery calcifications are   seen. Mitral annulus is calcified. Small amount of fluid is seen in   superior pericardial recess, and is loculated anterior to the SVC.  MEDIASTINUM AND LANNY: No lymphadenopathy.  CHEST WALL AND LOWER NECK: Posterior exophytic 1.7 cm left thyroid   nodule, and the inferior exophytic 1.5 cm partially calcified nodule.  VISUALIZED UPPER ABDOMEN: Within normal limits.  BONES: Degenerative changes.    IMPRESSION:    . Reticulonodular tree-in-bud infiltrate is seen in the left lower lobe.   There is peribronchial thickening and mucous plugging in the lower lobe   bronchi. Similar focal infiltrate is seen in the right upper lobe   centrally. Findings are likely infectious (less likely inflammatory).    Posterior exophytic 1.7 cm left thyroid nodule and the inferior exophytic   1.5 cm partially calcified nodule.. Dedicated ultrasound is recommended.    --- End of Report ---            LETITIA PEREZ MD; Attending Interventional Radiologist  This document has been electronically signed. Dec 28 2024  8:03PM    < end of copied text >

## 2024-12-30 NOTE — DISCHARGE NOTE NURSING/CASE MANAGEMENT/SOCIAL WORK - PATIENT PORTAL LINK FT
You can access the FollowMyHealth Patient Portal offered by [facility] by registering at the following website: [email or web address]. By joining [employer]’s FollowMyHealth portal, you will also be able to view your health information using other applications (apps) compatible with our system.

## 2024-12-30 NOTE — DISCHARGE NOTE NURSING/CASE MANAGEMENT/SOCIAL WORK - NSDCPEFALRISK_GEN_ALL_CORE
For information on Fall & Injury Prevention, visit: [email or web address] OR  [email or web address] OR  https://www.cdc.gov/steadi/patient.html

## 2024-12-30 NOTE — PROGRESS NOTE ADULT - ASSESSMENT
63-yo M w/ PMH T2DM and HTN, presenting for cough, sputum, and chest tightness, refractory to outpatient oral antibiotics, and w/ abnormal stress test. Cardiac eval pending. CXR neg, and minimal elevation of procal. COVID/RSV/Flu swab positive with Flu A.    Patient has new onset of fever while inpatient. Labs and CXR not suggestive of pneumonia. Started oseltamivir for new fever on 12/27.    #Flu A  #Fever  #Chest pain    Recommendations:  - Continue with oseltamivir 75 mg PO Q12H x5d  - Cough management per primary team    Plan discussed with CICU provider team.  Thank you for this consult.     Jayjay Miller MD, PhD  Attending Physician  Division of Infectious Diseases  Department of Medicine    Please contact through [employer].  Office: [phone] (after 5 PM or weekend)

## 2024-12-30 NOTE — DISCHARGE NOTE NURSING/CASE MANAGEMENT/SOCIAL WORK - FINANCIAL ASSISTANCE
[facility] provides services at a reduced cost to those who are determined to be eligible through [facility]’s financial assistance program. Information regarding [facility]’s financial assistance program can be found by going to [email or web address] or by calling [phone].

## 2025-01-01 LAB
CULTURE RESULTS: SIGNIFICANT CHANGE UP
SPECIMEN SOURCE: SIGNIFICANT CHANGE UP

## 2025-01-02 ENCOUNTER — APPOINTMENT (OUTPATIENT)
Dept: CARDIOLOGY | Facility: CLINIC | Age: 65
End: 2025-01-02

## 2025-01-06 ENCOUNTER — APPOINTMENT (OUTPATIENT)
Dept: CARDIOLOGY | Facility: CLINIC | Age: 65
End: 2025-01-06
Payer: COMMERCIAL

## 2025-01-06 VITALS
HEIGHT: 67 IN | HEART RATE: 62 BPM | BODY MASS INDEX: 39.87 KG/M2 | WEIGHT: 254 LBS | DIASTOLIC BLOOD PRESSURE: 62 MMHG | SYSTOLIC BLOOD PRESSURE: 124 MMHG

## 2025-01-06 DIAGNOSIS — E11.9 TYPE 2 DIABETES MELLITUS W/OUT COMPLICATIONS: ICD-10-CM

## 2025-01-06 DIAGNOSIS — Z79.4 TYPE 2 DIABETES MELLITUS W/OUT COMPLICATIONS: ICD-10-CM

## 2025-01-06 DIAGNOSIS — K21.9 GASTRO-ESOPHAGEAL REFLUX DISEASE W/OUT ESOPHAGITIS: ICD-10-CM

## 2025-01-06 DIAGNOSIS — E78.00 PURE HYPERCHOLESTEROLEMIA, UNSPECIFIED: ICD-10-CM

## 2025-01-06 DIAGNOSIS — I25.10 ATHEROSCLEROTIC HEART DISEASE OF NATIVE CORONARY ARTERY W/OUT ANGINA PECTORIS: ICD-10-CM

## 2025-01-06 PROCEDURE — 93000 ELECTROCARDIOGRAM COMPLETE: CPT

## 2025-01-06 PROCEDURE — 99214 OFFICE O/P EST MOD 30 MIN: CPT | Mod: 25

## 2025-01-06 RX ORDER — CLOPIDOGREL BISULFATE 75 MG/1
75 TABLET, FILM COATED ORAL
Qty: 90 | Refills: 1 | Status: ACTIVE | COMMUNITY
Start: 2025-01-06 | End: 1900-01-01

## 2025-01-06 RX ORDER — METOPROLOL SUCCINATE 50 MG/1
50 TABLET, EXTENDED RELEASE ORAL
Qty: 90 | Refills: 1 | Status: ACTIVE | COMMUNITY
Start: 2025-01-06

## 2025-01-07 LAB
ALBUMIN SERPL ELPH-MCNC: 3.8 G/DL
ALP BLD-CCNC: 126 U/L
ALT SERPL-CCNC: 15 U/L
ANION GAP SERPL CALC-SCNC: 14 MMOL/L
AST SERPL-CCNC: 13 U/L
BASOPHILS # BLD AUTO: 0.05 K/UL
BASOPHILS NFR BLD AUTO: 0.6 %
BILIRUB SERPL-MCNC: 0.2 MG/DL
BUN SERPL-MCNC: 14 MG/DL
CALCIUM SERPL-MCNC: 9.6 MG/DL
CHLORIDE SERPL-SCNC: 100 MMOL/L
CHOLEST SERPL-MCNC: 123 MG/DL
CO2 SERPL-SCNC: 25 MMOL/L
CREAT SERPL-MCNC: 0.72 MG/DL
EGFR: 103 ML/MIN/1.73M2
EOSINOPHIL # BLD AUTO: 0.19 K/UL
EOSINOPHIL NFR BLD AUTO: 2.2 %
ESTIMATED AVERAGE GLUCOSE: 177 MG/DL
GLUCOSE SERPL-MCNC: 211 MG/DL
HBA1C MFR BLD HPLC: 7.8 %
HCT VFR BLD CALC: 37.8 %
HDLC SERPL-MCNC: 28 MG/DL
HGB BLD-MCNC: 12.1 G/DL
IMM GRANULOCYTES NFR BLD AUTO: 2.1 %
LDLC SERPL CALC-MCNC: 58 MG/DL
LYMPHOCYTES # BLD AUTO: 2.22 K/UL
LYMPHOCYTES NFR BLD AUTO: 25.7 %
MAN DIFF?: NORMAL
MCHC RBC-ENTMCNC: 28.1 PG
MCHC RBC-ENTMCNC: 32 G/DL
MCV RBC AUTO: 87.7 FL
MONOCYTES # BLD AUTO: 0.67 K/UL
MONOCYTES NFR BLD AUTO: 7.8 %
NEUTROPHILS # BLD AUTO: 5.32 K/UL
NEUTROPHILS NFR BLD AUTO: 61.6 %
NONHDLC SERPL-MCNC: 96 MG/DL
NT-PROBNP SERPL-MCNC: 44 PG/ML
PLATELET # BLD AUTO: 319 K/UL
POTASSIUM SERPL-SCNC: 4.7 MMOL/L
PROT SERPL-MCNC: 7 G/DL
RBC # BLD: 4.31 M/UL
RBC # FLD: 15.6 %
SODIUM SERPL-SCNC: 139 MMOL/L
TRIGL SERPL-MCNC: 235 MG/DL
TSH SERPL-ACNC: 1.71 UIU/ML
WBC # FLD AUTO: 8.63 K/UL

## 2025-01-21 ENCOUNTER — NON-APPOINTMENT (OUTPATIENT)
Age: 65
End: 2025-01-21

## 2025-01-24 DIAGNOSIS — R07.9 CHEST PAIN, UNSPECIFIED: ICD-10-CM

## 2025-01-24 DIAGNOSIS — I25.10 ATHEROSCLEROTIC HEART DISEASE OF NATIVE CORONARY ARTERY W/OUT ANGINA PECTORIS: ICD-10-CM

## 2025-01-27 ENCOUNTER — APPOINTMENT (OUTPATIENT)
Dept: CARDIOLOGY | Facility: CLINIC | Age: 65
End: 2025-01-27

## 2025-01-30 ENCOUNTER — APPOINTMENT (OUTPATIENT)
Dept: CARDIOLOGY | Facility: CLINIC | Age: 65
End: 2025-01-30
Payer: COMMERCIAL

## 2025-01-30 PROCEDURE — 93351 STRESS TTE COMPLETE: CPT

## 2025-01-30 PROCEDURE — 93325 DOPPLER ECHO COLOR FLOW MAPG: CPT

## 2025-01-30 PROCEDURE — 93320 DOPPLER ECHO COMPLETE: CPT

## 2025-01-30 PROCEDURE — 93306 TTE W/DOPPLER COMPLETE: CPT | Mod: 59

## 2025-03-18 ENCOUNTER — APPOINTMENT (OUTPATIENT)
Dept: CARDIOLOGY | Facility: CLINIC | Age: 65
End: 2025-03-18

## 2025-03-25 ENCOUNTER — EMERGENCY (EMERGENCY)
Facility: HOSPITAL | Age: 65
LOS: 1 days | Discharge: HOME | End: 2025-03-25
Attending: STUDENT IN AN ORGANIZED HEALTH CARE EDUCATION/TRAINING PROGRAM
Payer: COMMERCIAL

## 2025-03-25 VITALS
OXYGEN SATURATION: 96 % | HEART RATE: 98 BPM | RESPIRATION RATE: 16 BRPM | TEMPERATURE: 98 F | WEIGHT: 195.11 LBS | SYSTOLIC BLOOD PRESSURE: 150 MMHG | HEIGHT: 64 IN | DIASTOLIC BLOOD PRESSURE: 64 MMHG

## 2025-03-25 DIAGNOSIS — Z98.890 OTHER SPECIFIED POSTPROCEDURAL STATES: Chronic | ICD-10-CM

## 2025-03-25 PROCEDURE — 70450 CT HEAD/BRAIN W/O DYE: CPT | Mod: MC

## 2025-03-25 PROCEDURE — 70450 CT HEAD/BRAIN W/O DYE: CPT | Mod: 26

## 2025-03-25 PROCEDURE — 99284 EMERGENCY DEPT VISIT MOD MDM: CPT

## 2025-03-25 PROCEDURE — 99284 EMERGENCY DEPT VISIT MOD MDM: CPT | Mod: 25

## 2025-03-25 RX ORDER — BACITRACIN 500 UNIT/G
1 OINTMENT (GRAM) TOPICAL ONCE
Refills: 0 | Status: ACTIVE | OUTPATIENT
Start: 2025-03-25 | End: 2025-03-25

## 2025-03-25 NOTE — ED ADULT TRIAGE NOTE - BMI (KG/M2)
CAPRINI SCORE [CLOT]    AGE RELATED RISK FACTORS                                                       MOBILITY RELATED FACTORS  [ ] Age 41-60 years                                            (1 Point)                  [ ] Bed rest                                                        (1 Point)  [ x] Age: 61-74 years                                           (2 Points)                 [ ] Plaster cast                                                   (2 Points)  [ ] Age= 75 years                                              (3 Points)                 [ ] Bed bound for more than 72 hours                 (2 Points)    DISEASE RELATED RISK FACTORS                                               GENDER SPECIFIC FACTORS  [ ] Edema in the lower extremities                       (1 Point)                  [ ] Pregnancy                                                     (1 Point)  [ ] Varicose veins                                               (1 Point)                  [ ] Post-partum < 6 weeks                                   (1 Point)             [ x] BMI > 25 Kg/m2                                            (1 Point)                  [ ] Hormonal therapy  or oral contraception          (1 Point)                 [ ] Sepsis (in the previous month)                        (1 Point)                  [ ] History of pregnancy complications                 (1 point)  [ ] Pneumonia or serious lung disease                                               [ ] Unexplained or recurrent                     (1 Point)           (in the previous month)                               (1 Point)  [ ] Abnormal pulmonary function test                     (1 Point)                 SURGERY RELATED RISK FACTORS  [ ] Acute myocardial infarction                              (1 Point)                 [ ]  Section                                             (1 Point)  [ ] Congestive heart failure (in the previous month)  (1 Point)               [ ] Minor surgery                                                  (1 Point)   [ ] Inflammatory bowel disease                             (1 Point)                 [x ] Arthroscopic surgery                                        (2 Points)  [ ] Central venous access                                      (2 Points)                [ x] General surgery lasting more than 45 minutes   (2 Points)       [ ] Stroke (in the previous month)                          (5 Points)               [ ] Elective arthroplasty                                         (5 Points)                                                                                                                                               HEMATOLOGY RELATED FACTORS                                                 TRAUMA RELATED RISK FACTORS  [ ] Prior episodes of VTE                                     (3 Points)                [ ] Fracture of the hip, pelvis, or leg                       (5 Points)  [ ] Positive family history for VTE                         (3 Points)                 [ ] Acute spinal cord injury (in the previous month)  (5 Points)  [ ] Prothrombin 75791 A                                     (3 Points)                 [ ] Paralysis  (less than 1 month)                             (5 Points)  [ ] Factor V Leiden                                             (3 Points)                  [ ] Multiple Trauma within 1 month                        (5 Points)  [ ] Lupus anticoagulants                                     (3 Points)                                                           [ ] Anticardiolipin antibodies                               (3 Points)                                                       [ ] High homocysteine in the blood                      (3 Points)                                             [ ] Other congenital or acquired thrombophilia      (3 Points)                                                [ ] Heparin induced thrombocytopenia                  (3 Points)                                          Total Score [     7     ]    Caprini Score 0 - 2:  Low Risk, No VTE Prophylaxis required for most patients, encourage ambulation  Caprini Score 3 - 6:  At Risk, pharmacologic VTE prophylaxis is indicated for most patients (in the absence of a contraindication)  Caprini Score Greater than or = 7:  High Risk, pharmacologic VTE prophylaxis is indicated for most patients (in the absence of a contraindication)
33.5

## 2025-03-25 NOTE — ED PROVIDER NOTE - PROGRESS NOTE DETAILS
EM PGY-2/Arie DO: CTH unremarkable. Cleaned abrasion to L temple and applied bacitracin. I have discussed all results, discharge instructions, strict return precautions and need for follow up with patient. They have verbalized understanding and agreement to plan at this time. Amenable to discharge.

## 2025-03-25 NOTE — ED PROVIDER NOTE - CLINICAL SUMMARY MEDICAL DECISION MAKING FREE TEXT BOX
EM PGY-2/Arie DO: 63-year-old male PMHx HTN HLD GERD, CAD s/p stents presents to ED for evaluation of mechanical trip and fall with head strike on 2 piece of wood that occurred around PTA.  Patient denies any LOC.  UTD tetanus vaccine.  Was able to get himself up and has been able to ambulate without assistance since that is baseline.  Denies any other injuries or associated areas of pain.  Has abrasions to left forehead that are hemostatic. Not on AC.  Denies any headache, vision changes, lightheadedness, numbness, tingling.  Denies any current or previous chest pain, SOB, palpitations or any inciting factors leading to the fall.  No other complaints.    MDM: Patient with nonfocal neuroexam presenting with mechanical fall with head strike with abrasion to left forehead that is hemostatic.  Low suspicion for ICH or fracture.  Will obtain CT head.  Wound care.  If CT head unremarkable plan for discharge. EM PGY-2/Arie DO: 63-year-old male PMHx HTN HLD GERD, CAD s/p stents presents to ED for evaluation of mechanical trip and fall with head strike on 2 piece of wood that occurred around PTA.  On Plavix. Patient denies any LOC.  UTD tetanus vaccine.  Was able to get himself up and has been able to ambulate without assistance since that is baseline.  Denies any other injuries or associated areas of pain.  Has abrasions to left forehead that are hemostatic.  Denies any headache, vision changes, lightheadedness, numbness, tingling.  Denies any current or previous chest pain, SOB, palpitations or any inciting factors leading to the fall.  No other complaints.    MDM: Patient with nonfocal neuroexam presenting with mechanical fall with head strike with abrasion to left forehead that is hemostatic.  Low suspicion for ICH or fracture.  Will obtain CT head.  Wound care.  If CT head unremarkable plan for discharge.

## 2025-03-25 NOTE — ED ADULT NURSE NOTE - OBJECTIVE STATEMENT
64 y.o M A&Ox4 w. PMH of stent (on Plavix) presents to ED complaining of fall. Pt states that he had mechanical trip and fall earlier today where he tripped and fell causing him to hit his head. Pt denies any LOC, CP,  dizziness, nausea, vomiting, blurred vision. Pt has lac over left eye. Pt states that he just wanted to get checked out since he is on a blood thinner. VSS at this time.

## 2025-03-25 NOTE — ED PROVIDER NOTE - ATTENDING CONTRIBUTION TO CARE
63 M w/ PMH of HTN, HLD, GERD, CAD s/p stent presents to the ED w/ mechanical fall prior to arrival, occurred at 230 pm, pt reports was walking out side and fell on plywood no loc, no cp, no sob, no back pain, reports mild h/a is on asa and plavix which prompted arrival  pt w/ no fevers, no chills, no nausea no vomiting, pt w/ no cp,   Const: appearing stated age, no acute distress  Head: atraumatic, normocephalic abrasion on the R forehead   Eyes: no conjunctival injection and no scleral icterus  ENMT: Atraumatic external nose and ears, Moist mucus membranes  Neck: Symmetric, trachea midline, no c spine tenderness  BACK: no bruising, no midline t/l spine tenderness  CVS: +S1/S2, dorsalis pedis/radial pulse 2+ bilaterally  RESP: Unlabored respiratory effort, Clear to auscultation bilaterally  GI: Nontender/Nondistended, soft abdomen  MSK: Extremities w/o deformity or ttp   Skin: Warm, Dry w/ no rashes  Neuro: GCS=15, CNs II-XII grossly intact, motor in all 4 extremities equal, Sensation grossly intact   Psych: Awake, Alert, & Orientedx3;  Appropriate mood and affect, cooperative  fall w/ normal head ct pt on antiplatelet agents, plan for outpt follow up

## 2025-03-25 NOTE — ED ADULT NURSE NOTE - CAS TRG GEN SKIN COLOR
[FreeTextEntry1] : Patient seem to have weight fluctuations due to the hormonal disorder of PCOS based upon the clinical signs and symptoms. Explained to patient that PCOS is a clinical diagnosis, and can cause weight gain, irregular menses and scalp hair loss and  hirsutism. However, will still rule out other hormonal disorder Counseled regarding the PCOS including complications of diabetes, hypercholesterolemia, insulin resistance, obesity, subfertility/infertility, irregular menses. Discussed that the treatment of PCOS is weight loss, low calorie diet and physical activity, metformin and OCPs. As patient is already doing low  calorie diet and physical activity and is able to lose weight, therefore her menses are now getting regular.  Patient wants to hold off on metformin, spironolactone and OCP at this time Check HCG to rule out pregnancy.  Check LH, FSH, estradiol, total testosterone, and DHEA-S ideally on day 3 of her menstruation cycle. Check 17 hydroxyprogesterone to rule out non-classic congenital adrenal hyperplasia Check insulin level and A1c given patient has severe acanthosis nigricans. Discussed that if there will be higher insulin levels and A1c will offer metformin that will help lowering the weight and decreasing insulin resistance. - check LDDST-rule out Cushing syndrome  RTC in 3 months  Normal for race

## 2025-03-25 NOTE — ED PROVIDER NOTE - PATIENT PORTAL LINK FT
You can access the FollowMyHealth Patient Portal offered by [facility] by registering at the following website: [email or web address]. By joining [facility]’s FollowMyHealth portal, you will also be able to view your health information using other applications (apps) compatible with our system.

## 2025-03-25 NOTE — ED PROVIDER NOTE - CARE PLAN
1 Principal Discharge DX:	Abrasion   Principal Discharge DX:	Abrasion  Secondary Diagnosis:	Closed head injury

## 2025-03-25 NOTE — ED PROVIDER NOTE - RAPID ASSESSMENT
64 yo M with a PMH of HTN, HLD, GERD, CAD sp stent presents status post mechanical fall prior to arrival. Patient was in his backyard turned too quickly and tripped on the pavement falling onto the left side of his body hitting his head on the floor.  No LOC.  Patient was able to get himself up and has been ambulating since.  Denies any headache or dizziness changes in vision or speech nausea or vomiting.  Given that he is on aspirin was concerned and decided to come in for evaluation. Denies neck or back pain. 64 yo M with a PMH of HTN, HLD, GERD, CAD sp stent presents status post mechanical fall prior to arrival. Patient was in his backyard turned too quickly and tripped on the pavement falling onto the left side of his body hitting his head on the floor.  No LOC.  Patient was able to get himself up and has been ambulating since.  Denies any headache or dizziness changes in vision or speech nausea or vomiting.  Given that he is on aspirin was concerned and decided to come in for evaluation. Denies neck or back pain.    Attending MD Garrett: This patient was seen and orders were placed by the PA as per our department's QPA model.  I was not consulted in regards to this patient although I was present and available in the Emergency Department to the PA.  Patient was to be sent to main ED for full medical evaluation and receiving team was to follow up on any labs, analgesia, clinical imaging ordered by the PA.  Any reassessment and disposition decisions were to be made by receiving team as clinically indicated, all decisions regarding the progression of care to be made at their discretion.  I did not perform a comprehensive history and physical on this patient.

## 2025-03-25 NOTE — ED PROVIDER NOTE - NSFOLLOWUPINSTRUCTIONS_ED_ALL_ED_FT
You have been evaluated in the Emergency Department today for FALL. Your evaluation did not show evidence of medical conditions requiring emergent intervention at this time. The results of your labs and imaging are included in your discharge paperwork. Bring this paperwork with you to any follow up appointments to show to your doctors.    Please follow up with your primary care physician within two days.    UNLESS OTHERWISE DIRECTED BY YOUR PRIMARY DOCTOR, for discomfort at home, you can alternate between 600mg ibuprofen (Motrin, Advil, etc.) and 650-975mg acetaminophen (Tylenol) every 6-8 hours. If your pain is severe, you can take them both together at the same time. Please do not exceed 3200mg ibuprofen or 4000mg Tylenol in one day. Please consult with your primary doctor before taking any medications on a regular basis.  - If you have a history of KIDNEY DISEASE, BLEEDING PROBLEMS, ACID REFLUX or STOMACH ULCERS, medications such as ibuprofen, Alleve, Motrin, etc. may not be a good choice for you.  - If you have a history of LIVER DISEASE or REGULAR ALCOHOL USE, acetaminophen (Tylenol) may not be a good choice for you.    Return to the Emergency Department if you experience worsening or uncontrolled pain, vision changes, recurrent vomiting, difficulty with normal activities, abnormal behavior, difficulty walking, numbness, weakness, or any other concerning symptoms.

## 2025-05-20 ENCOUNTER — APPOINTMENT (OUTPATIENT)
Dept: CARDIOLOGY | Facility: CLINIC | Age: 65
End: 2025-05-20
Payer: COMMERCIAL

## 2025-05-20 ENCOUNTER — NON-APPOINTMENT (OUTPATIENT)
Age: 65
End: 2025-05-20

## 2025-05-20 VITALS
DIASTOLIC BLOOD PRESSURE: 71 MMHG | WEIGHT: 255 LBS | HEIGHT: 67 IN | BODY MASS INDEX: 40.02 KG/M2 | HEART RATE: 85 BPM | SYSTOLIC BLOOD PRESSURE: 125 MMHG | OXYGEN SATURATION: 95 %

## 2025-05-20 DIAGNOSIS — E11.9 TYPE 2 DIABETES MELLITUS W/OUT COMPLICATIONS: ICD-10-CM

## 2025-05-20 DIAGNOSIS — E78.00 PURE HYPERCHOLESTEROLEMIA, UNSPECIFIED: ICD-10-CM

## 2025-05-20 DIAGNOSIS — Z79.4 TYPE 2 DIABETES MELLITUS W/OUT COMPLICATIONS: ICD-10-CM

## 2025-05-20 DIAGNOSIS — I25.10 ATHEROSCLEROTIC HEART DISEASE OF NATIVE CORONARY ARTERY W/OUT ANGINA PECTORIS: ICD-10-CM

## 2025-05-20 DIAGNOSIS — K21.9 GASTRO-ESOPHAGEAL REFLUX DISEASE W/OUT ESOPHAGITIS: ICD-10-CM

## 2025-05-20 PROCEDURE — 99214 OFFICE O/P EST MOD 30 MIN: CPT | Mod: 25

## 2025-05-20 PROCEDURE — 93000 ELECTROCARDIOGRAM COMPLETE: CPT

## 2025-05-20 RX ORDER — PANTOPRAZOLE 40 MG/1
40 TABLET, DELAYED RELEASE ORAL DAILY
Qty: 90 | Refills: 1 | Status: ACTIVE | COMMUNITY
Start: 2025-05-20 | End: 1900-01-01

## 2025-05-21 ENCOUNTER — NON-APPOINTMENT (OUTPATIENT)
Age: 65
End: 2025-05-21

## 2025-05-21 LAB
ALBUMIN SERPL ELPH-MCNC: 4.1 G/DL
ALP BLD-CCNC: 118 U/L
ALT SERPL-CCNC: 19 U/L
ANION GAP SERPL CALC-SCNC: 15 MMOL/L
AST SERPL-CCNC: 17 U/L
BASOPHILS # BLD AUTO: 0.06 K/UL
BASOPHILS NFR BLD AUTO: 0.7 %
BILIRUB SERPL-MCNC: 0.4 MG/DL
BUN SERPL-MCNC: 23 MG/DL
CALCIUM SERPL-MCNC: 9.6 MG/DL
CHLORIDE SERPL-SCNC: 99 MMOL/L
CHOLEST SERPL-MCNC: 134 MG/DL
CO2 SERPL-SCNC: 25 MMOL/L
CREAT SERPL-MCNC: 0.72 MG/DL
EGFRCR SERPLBLD CKD-EPI 2021: 103 ML/MIN/1.73M2
EOSINOPHIL # BLD AUTO: 0.14 K/UL
EOSINOPHIL NFR BLD AUTO: 1.5 %
ESTIMATED AVERAGE GLUCOSE: 186 MG/DL
GLUCOSE SERPL-MCNC: 199 MG/DL
HBA1C MFR BLD HPLC: 8.1 %
HCT VFR BLD CALC: 39 %
HDLC SERPL-MCNC: 33 MG/DL
HGB BLD-MCNC: 12.8 G/DL
IMM GRANULOCYTES NFR BLD AUTO: 0.9 %
LDLC SERPL-MCNC: 76 MG/DL
LYMPHOCYTES # BLD AUTO: 1.45 K/UL
LYMPHOCYTES NFR BLD AUTO: 15.8 %
MAN DIFF?: NORMAL
MCHC RBC-ENTMCNC: 28.6 PG
MCHC RBC-ENTMCNC: 32.8 G/DL
MCV RBC AUTO: 87.1 FL
MONOCYTES # BLD AUTO: 0.5 K/UL
MONOCYTES NFR BLD AUTO: 5.4 %
NEUTROPHILS # BLD AUTO: 6.95 K/UL
NEUTROPHILS NFR BLD AUTO: 75.7 %
NONHDLC SERPL-MCNC: 101 MG/DL
NT-PROBNP SERPL-MCNC: <36 PG/ML
PLATELET # BLD AUTO: 245 K/UL
POTASSIUM SERPL-SCNC: 4.3 MMOL/L
PROT SERPL-MCNC: 7 G/DL
RBC # BLD: 4.48 M/UL
RBC # FLD: 15.4 %
SODIUM SERPL-SCNC: 139 MMOL/L
TRIGL SERPL-MCNC: 137 MG/DL
TSH SERPL-ACNC: 1.66 UIU/ML
WBC # FLD AUTO: 9.18 K/UL

## 2025-08-25 ENCOUNTER — APPOINTMENT (OUTPATIENT)
Dept: CARDIOLOGY | Facility: CLINIC | Age: 65
End: 2025-08-25
Payer: COMMERCIAL

## 2025-08-25 VITALS
SYSTOLIC BLOOD PRESSURE: 120 MMHG | OXYGEN SATURATION: 95 % | DIASTOLIC BLOOD PRESSURE: 70 MMHG | HEART RATE: 91 BPM | BODY MASS INDEX: 40.57 KG/M2 | WEIGHT: 259 LBS

## 2025-08-25 DIAGNOSIS — E66.9 OBESITY, UNSPECIFIED: ICD-10-CM

## 2025-08-25 DIAGNOSIS — I25.10 ATHEROSCLEROTIC HEART DISEASE OF NATIVE CORONARY ARTERY W/OUT ANGINA PECTORIS: ICD-10-CM

## 2025-08-25 DIAGNOSIS — E78.00 PURE HYPERCHOLESTEROLEMIA, UNSPECIFIED: ICD-10-CM

## 2025-08-25 DIAGNOSIS — E11.9 TYPE 2 DIABETES MELLITUS W/OUT COMPLICATIONS: ICD-10-CM

## 2025-08-25 DIAGNOSIS — Z79.4 TYPE 2 DIABETES MELLITUS W/OUT COMPLICATIONS: ICD-10-CM

## 2025-08-25 PROCEDURE — 93000 ELECTROCARDIOGRAM COMPLETE: CPT

## 2025-08-25 PROCEDURE — 99214 OFFICE O/P EST MOD 30 MIN: CPT | Mod: 25

## 2025-08-26 LAB
ALBUMIN SERPL ELPH-MCNC: 4.2 G/DL
ALP BLD-CCNC: 119 U/L
ALT SERPL-CCNC: 13 U/L
ANION GAP SERPL CALC-SCNC: 13 MMOL/L
AST SERPL-CCNC: 17 U/L
BASOPHILS # BLD AUTO: 0.04 K/UL
BASOPHILS NFR BLD AUTO: 0.5 %
BILIRUB SERPL-MCNC: 0.3 MG/DL
BUN SERPL-MCNC: 15 MG/DL
CALCIUM SERPL-MCNC: 9.4 MG/DL
CHLORIDE SERPL-SCNC: 102 MMOL/L
CHOLEST SERPL-MCNC: 118 MG/DL
CO2 SERPL-SCNC: 24 MMOL/L
CREAT SERPL-MCNC: 0.66 MG/DL
EGFRCR SERPLBLD CKD-EPI 2021: 105 ML/MIN/1.73M2
EOSINOPHIL # BLD AUTO: 0.15 K/UL
EOSINOPHIL NFR BLD AUTO: 1.7 %
ESTIMATED AVERAGE GLUCOSE: 189 MG/DL
GLUCOSE SERPL-MCNC: 118 MG/DL
HBA1C MFR BLD HPLC: 8.2 %
HCT VFR BLD CALC: 42 %
HDLC SERPL-MCNC: 29 MG/DL
HGB BLD-MCNC: 13.1 G/DL
IMM GRANULOCYTES NFR BLD AUTO: 0.8 %
LDLC SERPL-MCNC: 55 MG/DL
LYMPHOCYTES # BLD AUTO: 1.82 K/UL
LYMPHOCYTES NFR BLD AUTO: 20.8 %
MAN DIFF?: NORMAL
MCHC RBC-ENTMCNC: 28.3 PG
MCHC RBC-ENTMCNC: 31.2 G/DL
MCV RBC AUTO: 90.7 FL
MONOCYTES # BLD AUTO: 0.53 K/UL
MONOCYTES NFR BLD AUTO: 6 %
NEUTROPHILS # BLD AUTO: 6.16 K/UL
NEUTROPHILS NFR BLD AUTO: 70.2 %
NONHDLC SERPL-MCNC: 89 MG/DL
NT-PROBNP SERPL-MCNC: 38 PG/ML
PLATELET # BLD AUTO: 226 K/UL
POTASSIUM SERPL-SCNC: 4.5 MMOL/L
PROT SERPL-MCNC: 6.8 G/DL
RBC # BLD: 4.63 M/UL
RBC # FLD: 15.2 %
SODIUM SERPL-SCNC: 140 MMOL/L
TRIGL SERPL-MCNC: 206 MG/DL
TSH SERPL-ACNC: 1.8 UIU/ML
WBC # FLD AUTO: 8.77 K/UL